# Patient Record
Sex: MALE | Race: WHITE | HISPANIC OR LATINO | Employment: FULL TIME | URBAN - METROPOLITAN AREA
[De-identification: names, ages, dates, MRNs, and addresses within clinical notes are randomized per-mention and may not be internally consistent; named-entity substitution may affect disease eponyms.]

---

## 2024-03-19 ENCOUNTER — OFFICE VISIT (OUTPATIENT)
Dept: FAMILY MEDICINE | Facility: CLINIC | Age: 39
End: 2024-03-19
Payer: COMMERCIAL

## 2024-03-19 VITALS
OXYGEN SATURATION: 99 % | WEIGHT: 180.9 LBS | DIASTOLIC BLOOD PRESSURE: 80 MMHG | RESPIRATION RATE: 16 BRPM | HEART RATE: 92 BPM | HEIGHT: 66 IN | SYSTOLIC BLOOD PRESSURE: 124 MMHG | TEMPERATURE: 98.2 F | BODY MASS INDEX: 29.07 KG/M2

## 2024-03-19 DIAGNOSIS — Z11.59 ENCOUNTER FOR HEPATITIS C SCREENING TEST FOR LOW RISK PATIENT: ICD-10-CM

## 2024-03-19 DIAGNOSIS — K42.9 UMBILICAL HERNIA WITHOUT OBSTRUCTION AND WITHOUT GANGRENE: ICD-10-CM

## 2024-03-19 DIAGNOSIS — Z11.4 ENCOUNTER FOR SCREENING FOR HIV: ICD-10-CM

## 2024-03-19 DIAGNOSIS — Z00.00 ROUTINE GENERAL MEDICAL EXAMINATION AT A HEALTH CARE FACILITY: Primary | ICD-10-CM

## 2024-03-19 LAB
ALBUMIN SERPL BCG-MCNC: 4.7 G/DL (ref 3.5–5.2)
ALP SERPL-CCNC: 69 U/L (ref 40–150)
ALT SERPL W P-5'-P-CCNC: 18 U/L (ref 0–70)
ANION GAP SERPL CALCULATED.3IONS-SCNC: 13 MMOL/L (ref 7–15)
AST SERPL W P-5'-P-CCNC: 16 U/L (ref 0–45)
BILIRUB SERPL-MCNC: 0.4 MG/DL
BUN SERPL-MCNC: 12.6 MG/DL (ref 6–20)
CALCIUM SERPL-MCNC: 9.7 MG/DL (ref 8.6–10)
CHLORIDE SERPL-SCNC: 99 MMOL/L (ref 98–107)
CHOLEST SERPL-MCNC: 227 MG/DL
CREAT SERPL-MCNC: 0.68 MG/DL (ref 0.67–1.17)
DEPRECATED HCO3 PLAS-SCNC: 23 MMOL/L (ref 22–29)
EGFRCR SERPLBLD CKD-EPI 2021: >90 ML/MIN/1.73M2
ERYTHROCYTE [DISTWIDTH] IN BLOOD BY AUTOMATED COUNT: 12.1 % (ref 10–15)
FASTING STATUS PATIENT QL REPORTED: YES
GLUCOSE SERPL-MCNC: 335 MG/DL (ref 70–99)
HBA1C MFR BLD: 13.6 % (ref 0–5.6)
HCT VFR BLD AUTO: 44.4 % (ref 40–53)
HCV AB SERPL QL IA: NONREACTIVE
HDLC SERPL-MCNC: 36 MG/DL
HGB BLD-MCNC: 15.7 G/DL (ref 13.3–17.7)
HIV 1+2 AB+HIV1 P24 AG SERPL QL IA: NONREACTIVE
LDLC SERPL CALC-MCNC: 116 MG/DL
MCH RBC QN AUTO: 28.8 PG (ref 26.5–33)
MCHC RBC AUTO-ENTMCNC: 35.4 G/DL (ref 31.5–36.5)
MCV RBC AUTO: 81 FL (ref 78–100)
NONHDLC SERPL-MCNC: 191 MG/DL
PLATELET # BLD AUTO: 280 10E3/UL (ref 150–450)
POTASSIUM SERPL-SCNC: 4.3 MMOL/L (ref 3.4–5.3)
PROT SERPL-MCNC: 8.2 G/DL (ref 6.4–8.3)
RBC # BLD AUTO: 5.46 10E6/UL (ref 4.4–5.9)
SODIUM SERPL-SCNC: 135 MMOL/L (ref 135–145)
TRIGL SERPL-MCNC: 376 MG/DL
TSH SERPL DL<=0.005 MIU/L-ACNC: 1.39 UIU/ML (ref 0.3–4.2)
VIT D+METAB SERPL-MCNC: 17 NG/ML (ref 20–50)
WBC # BLD AUTO: 6.8 10E3/UL (ref 4–11)

## 2024-03-19 PROCEDURE — 36415 COLL VENOUS BLD VENIPUNCTURE: CPT | Performed by: STUDENT IN AN ORGANIZED HEALTH CARE EDUCATION/TRAINING PROGRAM

## 2024-03-19 PROCEDURE — 82306 VITAMIN D 25 HYDROXY: CPT | Performed by: STUDENT IN AN ORGANIZED HEALTH CARE EDUCATION/TRAINING PROGRAM

## 2024-03-19 PROCEDURE — 99385 PREV VISIT NEW AGE 18-39: CPT | Performed by: STUDENT IN AN ORGANIZED HEALTH CARE EDUCATION/TRAINING PROGRAM

## 2024-03-19 PROCEDURE — 87389 HIV-1 AG W/HIV-1&-2 AB AG IA: CPT | Performed by: STUDENT IN AN ORGANIZED HEALTH CARE EDUCATION/TRAINING PROGRAM

## 2024-03-19 PROCEDURE — 83036 HEMOGLOBIN GLYCOSYLATED A1C: CPT | Performed by: STUDENT IN AN ORGANIZED HEALTH CARE EDUCATION/TRAINING PROGRAM

## 2024-03-19 PROCEDURE — 99213 OFFICE O/P EST LOW 20 MIN: CPT | Mod: 25 | Performed by: STUDENT IN AN ORGANIZED HEALTH CARE EDUCATION/TRAINING PROGRAM

## 2024-03-19 PROCEDURE — 85027 COMPLETE CBC AUTOMATED: CPT | Performed by: STUDENT IN AN ORGANIZED HEALTH CARE EDUCATION/TRAINING PROGRAM

## 2024-03-19 PROCEDURE — 80061 LIPID PANEL: CPT | Performed by: STUDENT IN AN ORGANIZED HEALTH CARE EDUCATION/TRAINING PROGRAM

## 2024-03-19 PROCEDURE — 84443 ASSAY THYROID STIM HORMONE: CPT | Performed by: STUDENT IN AN ORGANIZED HEALTH CARE EDUCATION/TRAINING PROGRAM

## 2024-03-19 PROCEDURE — 80053 COMPREHEN METABOLIC PANEL: CPT | Performed by: STUDENT IN AN ORGANIZED HEALTH CARE EDUCATION/TRAINING PROGRAM

## 2024-03-19 PROCEDURE — 86803 HEPATITIS C AB TEST: CPT | Performed by: STUDENT IN AN ORGANIZED HEALTH CARE EDUCATION/TRAINING PROGRAM

## 2024-03-19 SDOH — HEALTH STABILITY: PHYSICAL HEALTH: ON AVERAGE, HOW MANY DAYS PER WEEK DO YOU ENGAGE IN MODERATE TO STRENUOUS EXERCISE (LIKE A BRISK WALK)?: 0 DAYS

## 2024-03-19 ASSESSMENT — PAIN SCALES - GENERAL: PAINLEVEL: NO PAIN (0)

## 2024-03-19 ASSESSMENT — SOCIAL DETERMINANTS OF HEALTH (SDOH): HOW OFTEN DO YOU GET TOGETHER WITH FRIENDS OR RELATIVES?: ONCE A WEEK

## 2024-03-19 NOTE — COMMUNITY RESOURCES LIST (PATIENT PREFERRED LANGUAGE)
March 19, 2024           TU LISTA PERSONALIZADA DE SERVICIOS y PROGRAMAS           Y NAHOMI    de emergencia      Free - Client Services  [address] [place] [address] (Distancia: 4.9 millas)  Teléfono: [phone]  Sitio web: [email or web address]  Idioma: Elieser  Tarifa: [place]  Opciones de transporte: Transporte [facility] - Housing Stabilization Services  Teléfono: [phone]  Sitio web: [email or web address]-[email or web address]  Idioma: Elieser  Horas: Vic 9:00 a. m. - 5:00 p. m. Mar 9:00 a. m. - 5:00 p. m. Mié 9:00 a. m. - 5:00 p. m. Jue 9:00 a. m. - 5:00 p. m. Vie 9:00 a. m. - 5:00 p. m.  Tarifa: Seguro  Accesibilidad: Sordos o con problemas de audición, Servicios de traducción      [facility]I - YOUTH [facility]  Teléfono: [phone]  Sitio web: [email or web address] [email or web address]  Idioma: Elieser    ón de casos de vivienda      Community Services [facility] - Housing Stabilization Services  [address] [place] [address] (Distancia: 3.4 millas)  Teléfono: [phone]  Sitio web: [email or web address]  Idioma: Elieser Henderson, Clarice, Hmong, Somalí, Español  Tarifa: Seguro  Accesibilidad: Ada accesible, Alojamiento para personas ciegas, Sordos o con problemas de audición, Servicios de traducción  Opciones de transporte: Transporte gratuito      Living - Housing Stabilization Services  [address] [place] [address] (Distancia: 11.6 millas)  Teléfono: [phone]  Sitio web: [email or web address].[email or web address]  Idioma: Elieser Henderson Somalí  Tarpietro: ousmane Cornell [facility] - Housing Stabilization Services  Teléfono: [phone]  [facility] web: [email or web address]  Idioma: Elieser  Horas: Vic 8:00 a. m. - 4:00 p. m. Mar 8:00 a. m. - 4:00 p. m. Mié 8:00 a. m. - 4:00 p. m. Jue 8:00 a. m. - 4:00 p. m. Vie 8:00 a. m. - 4:00 p. m.  Deja: Jeniffer  Accesibilidad: Teja  para ciegos, sordos o con problemas de audición  Opciones de transporte: Transporte gratuito    sin carol previa para personas sin hogar      [facility] - Warming or cooling center - [facility] and [facility]  [address] [place] [address] (Distancia: 1.9 millas)  Teléfono: [phone]  Idioma: Elieser Patricka: Sharples  Accesibilidad: Ada accesible  Opciones de transporte: Transporte gratuito      [facility] - Drop-in center or day shelter  [address] [place] [address] (Distancia: 2.5 millas)  Teléfono: [phone]  Sitio web: http://Future [email or web address]  Idioma: Elieser Short: Sharples  Accesibilidad: Ada accesible      LOVE - LAUNDRY LOVE  Sitio web: http://www.laundrylove.[employer]        y Recreación    individuales/de equipo      of [employer] - Sports clubs and recreational activities  [address] [place] [address] (Distancia: 1.7 millas)  Teléfono: [phone]  Sitio web: https://[facility].gov/  Idioma: Elieser Patricka: Auto pago      [facility] [employer] [facility] - Sports clubs and recreational activities - [facility] [employer]  [address] [place] [address] (Distancia: 1.3 millas)  Teléfono: [phone]  Idioma: Kp Mon Laosiano  Tarifa: Auto pago      [facility] - Adult Enrichment  Teléfono: [phone]  Sitio web: [email or web address]-[email or web address]  Idioma: Elieser  Horas: Vic 7:30 a. m. - 4:00 p. m. Mar 7:30 a. m. - 4:00 p. m. Mié 7:30 a. m. - 4:00 p. m. Jue 7:30 a. m. - 4:00 p. m. Vie 7:30 a. m. - 4:00 p. m.    de perriicio      [facility] - VIRTUAL Y  [address] [place] [address] (Distancia: 1.5 millas)  Teléfono: [phone]  Sitio web: [email or web address]://www.[email or web address].[email or web address]-sxom-[email or web address]  Idioma: Elieser      of [place] - [employer]  [address] [place] [address] (Distancia: 1.3  deanne)  Idioma: Elieser  Accesibilidad: [facility] Services - Care Coordination (Healthcare only)  Teléfono: [phone]  Sitio web: https://[email or web address]  Idioma: Rafi Power  Horas: Mié 9:00 a. m. - 11:30 a. m. Jue 13:00 - 16:00, 17:30 - 19:00               NÚMEROS Y SITIOS WEB IMPORTANTES        Servicios de emergencia  911  .   La vía unida  211 http://211unitedway.org  .   Control de veneno  [phone] [phone] http://mnpoison.org http://wisconsinpoison.org  .     Salvavidas para el suicidio y las crisis  98http://988lifeline.org  .   Línea directa nacional de abuso infantil Childhelp  905.726.8641 http://Childhelphotline.org   .   Línea directa nacional de agresión sexual  [phone] (LAURIE) http://Rainn.org   .     Línea Nacional de Seguridad para Fugitivos  [phone] (FUGA) http://1800runaway.org  .   Apoyo easton el embarazo y el posparto  Llame o envíe un mensaje de texto al [phone] [place]: http://ppsupportmn.org [place]: http://[employer].com/wi  .   Línea de ayuda nacional para el abuso de sustancias ([facility])  [phone] (3731) http://Findtreatment.gov   .                DESCARGO DE RESPONSABILIDAD: [employer] no respalda a ningún proveedor de servicios mencionado en esta lista de recursos. [employer] no garantiza que los servicios mencionados en esta lista de recursos estén disponibles para usted o mejoren smyth shanda o bienestar.    [facility]

## 2024-03-19 NOTE — LETTER
March 20, 2024      Ralph Soto  [address] [place] [address]        Dear ,    We are writing to inform you of your test results.    Test results indicate you may require additional follow up, see comment below.    Please call clinic at [phone].     Resulted Orders   Hepatitis C Screen Reflex to HCV RNA Quant and Genotype   Result Value Ref Range    Hepatitis C Antibody Nonreactive Nonreactive      Comment:      A nonreactive screening test result does not exclude the possibility of exposure to or infection with HCV. Nonreactive screening test results in individuals with prior exposure to HCV may be due to antibody levels below the limit of detection of this assay or lack of reactivity to the HCV antigens used in this assay. Patients with recent HCV infections (<3 months from time of exposure) may have false-negative HCV antibody results due to the time needed for seroconversion (average of 8 to 9 weeks).   HIV Antigen Antibody Combo   Result Value Ref Range    HIV Antigen Antibody Combo Nonreactive Nonreactive      Comment:      Negative HIV-1 p24 antigen and HIV-1/2 antibody screening test results usually indicate the absence of HIV-1 and HIV-2 infection. However, such negative results do not rule-out acute HIV infection.  If acute HIV-1 or HIV-2 infection is suspected, detection of HIV-1 or HIV-2 RNA  is recommended.    Vitamin D Deficiency   Result Value Ref Range    Vitamin D, Total (25-Hydroxy) 17 (L) 20 - 50 ng/mL      Comment:      mild to moderate deficiency    Narrative    Season, race, dietary intake, and treatment affect the concentration of 25-hydroxy-Vitamin D. Values may decrease during winter months and increase during summer months.    Vitamin D determination is routinely performed by an immunoassay specific for 25 hydroxyvitamin D3.  If an individual is on vitamin D2(ergocalciferol) supplementation, please specify 25 OH vitamin D2 and D3 level determination by LCMSMS  test VITD23.     CBC with platelets   Result Value Ref Range    WBC Count 6.8 4.0 - 11.0 10e3/uL    RBC Count 5.46 4.40 - 5.90 10e6/uL    Hemoglobin 15.7 13.3 - 17.7 g/dL    Hematocrit 44.4 40.0 - 53.0 %    MCV 81 78 - 100 fL    MCH 28.8 26.5 - 33.0 pg    MCHC 35.4 31.5 - 36.5 g/dL    RDW 12.1 10.0 - 15.0 %    Platelet Count 280 150 - 450 10e3/uL   Comprehensive metabolic panel (BMP + Alb, Alk Phos, ALT, AST, Total. Bili, TP)   Result Value Ref Range    Sodium 135 135 - 145 mmol/L      Comment:      Reference intervals for this test were updated on 09/26/2023 to more accurately reflect our healthy population. There may be differences in the flagging of prior results with similar values performed with this method. Interpretation of those prior results can be made in the context of the updated reference intervals.     Potassium 4.3 3.4 - 5.3 mmol/L    Carbon Dioxide (CO2) 23 22 - 29 mmol/L    Anion Gap 13 7 - 15 mmol/L    Urea Nitrogen 12.6 6.0 - 20.0 mg/dL    Creatinine 0.68 0.67 - 1.17 mg/dL    GFR Estimate >90 >60 mL/min/1.73m2    Calcium 9.7 8.6 - 10.0 mg/dL    Chloride 99 98 - 107 mmol/L    Glucose 335 (H) 70 - 99 mg/dL    Alkaline Phosphatase 69 40 - 150 U/L      Comment:      Reference intervals for this test were updated on 11/14/2023 to more accurately reflect our healthy population. There may be differences in the flagging of prior results with similar values performed with this method. Interpretation of those prior results can be made in the context of the updated reference intervals.    AST 16 0 - 45 U/L      Comment:      Reference intervals for this test were updated on 6/12/2023 to more accurately reflect our healthy population. There may be differences in the flagging of prior results with similar values performed with this method. Interpretation of those prior results can be made in the context of the updated reference intervals.    ALT 18 0 - 70 U/L      Comment:      Reference intervals for this  test were updated on 6/12/2023 to more accurately reflect our healthy population. There may be differences in the flagging of prior results with similar values performed with this method. Interpretation of those prior results can be made in the context of the updated reference intervals.      Protein Total 8.2 6.4 - 8.3 g/dL    Albumin 4.7 3.5 - 5.2 g/dL    Bilirubin Total 0.4 <=1.2 mg/dL   Hemoglobin A1c   Result Value Ref Range    Hemoglobin A1C 13.6 (H) 0.0 - 5.6 %   Lipid Profile (Chol, Trig, HDL, LDL calc)   Result Value Ref Range    Cholesterol 227 (H) <200 mg/dL    Triglycerides 376 (H) <150 mg/dL    Direct Measure HDL 36 (L) >=40 mg/dL    LDL Cholesterol Calculated 116 (H) <=100 mg/dL    Non HDL Cholesterol 191 (H) <130 mg/dL    Patient Fasting > 8hrs? Yes     Narrative    Cholesterol  Desirable:  <200 mg/dL    Triglycerides  Normal:  Less than 150 mg/dL  Borderline High:  150-199 mg/dL  High:  200-499 mg/dL  Very High:  Greater than or equal to 500 mg/dL    Direct Measure HDL  Female:  Greater than or equal to 50 mg/dL   Male:  Greater than or equal to 40 mg/dL    LDL Cholesterol  Desirable:  <100mg/dL  Above Desirable:  100-129 mg/dL   Borderline High:  130-159 mg/dL   High:  160-189 mg/dL   Very High:  >= 190 mg/dL    Non HDL Cholesterol  Desirable:  130 mg/dL  Above Desirable:  130-159 mg/dL  Borderline High:  160-189 mg/dL  High:  190-219 mg/dL  Very High:  Greater than or equal to 220 mg/dL   TSH with free T4 reflex   Result Value Ref Range    TSH 1.39 0.30 - 4.20 uIU/mL       If you have any questions or concerns, please call the clinic at the number listed above.       Sincerely,      Estefania Vega, DO

## 2024-03-19 NOTE — PROGRESS NOTES
"Preventive Care Visit  [facility]  Estefania Vega DO, Family Medicine  Mar 19, 2024      SUBJECTIVE:   Ralph is a 38 year old, presenting for the following:  Physical (Patient is here for a physical today. No questions or concerns today. Just checking up on general health. )        3/19/2024     7:11 AM   Additional Questions   Roomed by Smita DIMAS MA   Accompanied by Self     Healthy Habits:     Getting at least 3 servings of Calcium per day:  NO    Bi-annual eye exam:  Yes    Dental care twice a year:  NO    Sleep apnea or symptoms of sleep apnea:  None    Diet:  Regular (no restrictions)    Frequency of exercise:  None    Duration of exercise:  N/A    Taking medications regularly:  Yes    Barriers to taking medications:  Not applicable    Medication side effects:  Not applicable    Additional concerns today:  Yes        Today's PHQ-2 Score:       3/19/2024     7:00 AM   PHQ-2 ( 1999 Pf[employer])   Q1: Little interest or pleasure in doing things 0   Q2: Feeling down, depressed or hopeless 0   PHQ-2 Score 0   Q1: Little interest or pleasure in doing things Not at all   Q2: Feeling down, depressed or hopeless Not at all   PHQ-2 Score 0       Have you ever done Advance Care Planning? (For example, a Health Directive, POLST, or a discussion with a medical provider or your loved ones about your wishes): No, advance care planning information given to patient to review.  Patient declined advance care planning discussion at this time.    Social History     Tobacco Use    Smoking status: Never    Smokeless tobacco: Never   Substance Use Topics    Alcohol use: Not on file             3/19/2024     6:59 AM   Alcohol Use   Prescreen: >3 drinks/day or >7 drinks/week? No       Last PSA: No results found for: \"PSA\"    Reviewed orders with patient. Reviewed health maintenance and updated orders accordingly - Yes  Lab work is in process  Labs reviewed in EPIC  BP Readings from Last 3 Encounters:   03/19/24 " "124/80    Wt Readings from Last 3 Encounters:   03/19/24 82.1 kg (180 lb 14.4 oz)                  There is no problem list on file for this patient.    History reviewed. No pertinent surgical history.    Social History     Tobacco Use    Smoking status: Never    Smokeless tobacco: Never   Substance Use Topics    Alcohol use: Not on file     History reviewed. No pertinent family history.      No current outpatient medications on file.     No Known Allergies    Reviewed and updated as needed this visit by clinical staff   Tobacco  Allergies  Meds              Reviewed and updated as needed this visit by Provider                  History reviewed. No pertinent past medical history.   History reviewed. No pertinent surgical history.  OB History   No obstetric history on file.     Review of Systems    Review of Systems  Constitutional, HEENT, cardiovascular, pulmonary, GI, , musculoskeletal, neuro, skin, endocrine and psych systems are negative, except as otherwise noted.    OBJECTIVE:   /80 (BP Location: Right arm, Patient Position: Sitting, Cuff Size: Adult Regular)   Pulse 92   Temp 98.2  F (36.8  C) (Oral)   Resp 16   Ht 1.67 m (5' 5.75\")   Wt 82.1 kg (180 lb 14.4 oz)   SpO2 99%   BMI 29.42 kg/m     Estimated body mass index is 29.42 kg/m  as calculated from the following:    Height as of this encounter: 1.67 m (5' 5.75\").    Weight as of this encounter: 82.1 kg (180 lb 14.4 oz).  Physical Exam  GENERAL: alert and no distress  EYES: Eyes grossly normal to inspection, PERRL and conjunctivae and sclerae normal  HENT: ear canals and TM's normal, nose and mouth without ulcers or lesions  NECK: no adenopathy, no asymmetry, masses, or scars  RESP: lungs clear to auscultation - no rales, rhonchi or wheezes  CV: regular rate and rhythm, normal S1 S2, no S3 or S4, no murmur, click or rub, no peripheral edema  ABDOMEN: soft, nontender, no hepatosplenomegaly, no masses and bowel sounds normal  MS: no gross " musculoskeletal defects noted, no edema  NEURO: Normal strength and tone, mentation intact and speech normal    ASSESSMENT/PLAN:        Diagnosis Comments   1. Routine general medical examination at a health care facility  TSH with free T4 reflex, Lipid Profile (Chol, Trig, HDL, LDL calc), Hemoglobin A1c, Comprehensive metabolic panel (BMP + Alb, Alk Phos, ALT, AST, Total. Bili, TP), CBC with platelets, Vitamin D Deficiency       2. Umbilical hernia without obstruction and without gangrene  Adult General Surg Referral       3. Encounter for hepatitis C screening test for low risk patient  Hepatitis C Screen Reflex to HCV RNA Quant and Genotype       4. Encounter for screening for HIV  HIV Antigen Antibody Combo           Patient who is coming here to establish care/preventative.    Patient moved here from [place] about 2 years ago.  Has not established a medical home.    Home life: wife and daughter   Occupation: drive a truck   Sexually active:yes, no concerns   Safety concerns:no   Diet/exercise:poor, counseling provided.    Family history of cancers:Maternal aunt got throat cancer ( smoker), Maternal uncle  ( age 40) - esophageal cancer, Maternal aunt ( ~ 40s) - uterine cancer   Eye exam/dental exam: UTD on eye exam.  Needs updated dental.  Has an upcoming appointment.  Alcohol use:no   Tobacco use/marijuana use/drug use:no  Mental health:stable   Vaccines: Patient notes he has his vaccination records at home.  Declines vaccines for today.  He will bring the vaccination records to the front and I will review them once available.  Blood work: Ordered    Umbilical hernia:  Has had it for about 14 years.  Is not growing, causing him pain or bothering him.  However, he is interested in getting elective surgery for it.  Surgical consult placed.    Unintentional weight loss:  Patient notes that when he moved from [place], he was about 220 pounds.  Over the last 1 to 2 years, he has been losing weight  "unintentionally.  He has not been exercising and has not changed his diet significantly.  Has no B type symptoms.  No fevers, night sweats, persistent fatigue.  Patient \"feels well\".  Plan:  - Will start with some baseline blood work to rule out secondary etiologies    Patient has been advised of split billing requirements and indicates understanding: Yes      Counseling  Reviewed preventive health counseling, as reflected in patient instructions      BMI  Estimated body mass index is 29.42 kg/m  as calculated from the following:    Height as of this encounter: 1.67 m (5' 5.75\").    Weight as of this encounter: 82.1 kg (180 lb 14.4 oz).   Weight management plan: Discussed healthy diet and exercise guidelines      He reports that he has never smoked. He has never used smokeless tobacco.        Signed Electronically by: Estefania Vega DO  "

## 2024-03-19 NOTE — PATIENT INSTRUCTIONS
Preventive Care Advice   This is general advice given by our system to help you stay healthy. However, your care team may have specific advice just for you. Please talk to your care team about your preventive care needs.  Nutrition  Eat 5 or more servings of fruits and vegetables each day.  Try wheat bread, brown rice and whole grain pasta (instead of white bread, rice, and pasta).  Get enough calcium and vitamin D. Check the label on foods and aim for 100% of the RDA (recommended daily allowance).  Lifestyle  Exercise at least 150 minutes each week   (30 minutes a day, 5 days a week).  Do muscle strengthening activities 2 days a week. These help control your weight and prevent disease.  No smoking.  Wear sunscreen to prevent skin cancer.  Have a dental exam and cleaning every 6 months.  Yearly exams  See your health care team every year to talk about:  Any changes in your health.  Any medicines your care team has prescribed.  Preventive care, family planning, and ways to prevent chronic diseases.  Shots (vaccines)   HPV shots (up to age 26), if you've never had them before.  Hepatitis B shots (up to age 59), if you've never had them before.  COVID-19 shot: Get this shot when it's due.  Flu shot: Get a flu shot every year.  Tetanus shot: Get a tetanus shot every 10 years.  Pneumococcal, hepatitis A, and RSV shots: Ask your care team if you need these based on your risk.  Shingles shot (for age 50 and up).  General health tests  Diabetes screening:  Starting at age 35, Get screened for diabetes at least every 3 years.  If you are younger than age 35, ask your care team if you should be screened for diabetes.  Cholesterol test: At age 39, start having a cholesterol test every 5 years, or more often if advised.  Bone density scan (DEXA): At age 50, ask your care team if you should have this scan for osteoporosis (brittle bones).  Hepatitis C: Get tested at least once in your life.  STIs (sexually transmitted  infections)  Before age 24: Ask your care team if you should be screened for STIs.  After age 24: Get screened for STIs if you're at risk. You are at risk for STIs (including HIV) if:  You are sexually active with more than one person.  You don't use condoms every time.  You or a partner was diagnosed with a sexually transmitted infection.  If you are at risk for HIV, ask about PrEP medicine to prevent HIV.  Get tested for HIV at least once in your life, whether you are at risk for HIV or not.  Cancer screening tests  Cervical cancer screening: If you have a cervix, begin getting regular cervical cancer screening tests at age 21. Most people who have regular screenings with normal results can stop after age 65. Talk about this with your provider.  Breast cancer scan (mammogram): If you've ever had breasts, begin having regular mammograms starting at age 40. This is a scan to check for breast cancer.  Colon cancer screening: It is important to start screening for colon cancer at age 45.  Have a colonoscopy test every 10 years (or more often if you're at risk) Or, ask your provider about stool tests like a FIT test every year or Cologuard test every 3 years.  To learn more about your testing options, visit: https://www.[employer]/409528.pdf.  For help making a decision, visit: https://bit.ly/tb86363.  Prostate cancer screening test: If you have a prostate and are age 55 to 69, ask your provider if you would benefit from a yearly prostate cancer screening test.  Lung cancer screening: If you are a current or former smoker age 50 to 80, ask your care team if ongoing lung cancer screenings are right for you.  For informational purposes only. Not to replace the advice of your health care provider. Copyright   2023 [employer]. All rights reserved. Clinically reviewed by the [facility] Transitions Program. [employer] 482018 - REV 01/24.

## 2024-03-19 NOTE — COMMUNITY RESOURCES LIST (ENGLISH)
March 19, 2024           YOUR PERSONALIZED LIST OF SERVICES & PROGRAMS           & SHELTER    Housing      Free - Client Services  [address] [place] [address] (Distance: 4.9 miles)  Phone: [phone]  Website: https://PingSome.[employer]/  Language: English  Fee: Free  Transportation Options: Free transportation      [facility] - Housing Stabilization Services  Phone: (932) 145-2907  Website: https://Tailored/Housing-Stabilization.html  Language: English  Hours: Mon 9:00 AM - 5:00 PM Tue 9:00 AM - 5:00 PM Wed 9:00 AM - 5:00 PM Thu 9:00 AM - 5:00 PM Fri 9:00 AM - 5:00 PM  Fee: Insurance  Accessibility: Deaf or hard of hearing, Translation services      [facility]  Phone: [phone]  Website: https://www.AirInSpace.org/  Language: English    Case Management      [employer] - Housing Stabilization Services  [address] [place] [address] (Distance: 3.4 miles)  Phone: [phone]) [phone]  Website: https://www.opportunityIn[employer]ire [employer] Systems.org/  Language: Pashto, English, Ukrainian, Hmong, Latvian, Tanzanian  Fee: Insurance  Accessibility: Ada accessible, Blind accommodation, Deaf or hard of hearing, Translation services  Transportation Options: Free transportation      Living - Housing Stabilization Services  [address] [place] [address] (Distance: 11.6 miles)  Phone: [phone]  Website: https://Un[employer] Systems.Datalot  Language: Pashto, English, Latvian  Fee: Insurance, Self pay      [employer] - Housing Stabilization Services  Phone: [phone]) [phone]  Website: https://homebasemn.com/  Language: English  Hours: Mon 8:00 AM - 4:00 PM Tue 8:00 AM - 4:00 PM Wed 8:00 AM - 4:00 PM Thu 8:00 AM - 4:00 PM Fri 8:00 AM - 4:00 PM  Fee: Free  Accessibility: Blind accommodation, Deaf or hard of hearing  Transportation Options: Free transportation    Drop-In Services      [facility] - Warming or cooling [place] -  [facility] [place]  [address] [place] [address] (Distance: 1.9 miles)  Phone: [phone]  Language: English  Fee: Free  Accessibility: Ada accessible  Transportation Options: Free transportation      [facility] - Drop-in center or day shelter  [address] [place] [address] (Distance: 2.5 miles)  Phone: [phone]) [phone]  Website: http://Qinec  Language: English  Fee: Free  Accessibility: Ada accessible      LOVE - LAUNDRY LOVE  Website: http://www.laundrylove.org        & RECREATION    Sports      [facility] - Sports clubs and recreational activities  [address] [place] [address] (Distance: 1.7 miles)  Phone: [phone]  Website: https://[place]mn.[employer]/  Language: English  Fee: Self pay      [facility] [facility] - Sports clubs and recreational activities - [facility] [employer] [facility]  [address] [place] [address] (Distance: 1.3 miles)  Phone: [phone]  Language: English, Sebastián Goodrich  Fee: Self pay      [facility] - Adult Enrichment  Phone: [phone]  Website: https://Tristar/adults-seniors/adult-enrichment/  Language: English  Hours: Mon 7:30 AM - 4:00 PM Tue 7:30 AM - 4:00 PM Wed 7:30 AM - 4:00 PM Thu 7:30 AM - 4:00 PM Fri 7:30 AM - 4:00 PM    Classes/Groups      [facility] - VIRTUAL Y  [address] [place] [address] (Distance: 1.5 miles)  Phone: [phone]  Website: https://www.CrowdFlower.org/estdgsb-ojzg-ewmkw  Language: English      of [place] - Open Gym  [address] [place] [address] (Distance: 1.3 miles)  Language: English  Accessibility: Ada accessible      [facility] Services - Care Coordination (Healthcare only)  Phone: [phone]  Website: https://[facility].org  Language: English, Lao  Hours: Wed 9:00 AM - 11:30 AM Thu 1:00 PM - 4:00 PM, 5:30 PM  - 7:00 PM               IMPORTANT NUMBERS & WEBSITES        Emergency Services  911  .   United Way  211 http://211unitedway.org  .   Poison Control  (197) 119-7703 http://[place]poison.org http://wisconsinpoison.org  .     Suicide and Crisis Lifeline  988 http://988lifeline.org  .   Childhelp [place] Child Abuse Hotline  364.554.4107 http://Childhelphotline.org   .   [place] Sexual Assault Hotline  (295) 188-6847 (HOPE) http://Twelvefoldn.org   .     [place] Runaway Safeline  (987) 713-1824 (RUNAWAY) http://ebooxter.comruPepperfry.com.Arcot Systems  .   Pregnancy & Postpartum Support  Call/text 242-847-5725  [place]: http://ppsupportmn.org  [place]: http://DigiSynd.com/wi  .   Substance Abuse National Helpline ([facility])  974-508-HELP (7281) http://Findtreatment.gov   .                DISCLAIMER: [employer] does not endorse any service providers mentioned in this resource list. [employer] does not guarantee that the services mentioned in this resource list will be available to you or will improve your health or wellness.    [facility]

## 2024-03-20 ENCOUNTER — TELEPHONE (OUTPATIENT)
Dept: FAMILY MEDICINE | Facility: CLINIC | Age: 39
End: 2024-03-20
Payer: COMMERCIAL

## 2024-03-20 NOTE — TELEPHONE ENCOUNTER
LMTCB for the pt with a phone .     If pt returns this call, please review the result message below with him from provider and assist in scheduling appt as needed.     Thank you     Result letter mailed.

## 2024-03-20 NOTE — TELEPHONE ENCOUNTER
----- Message from Estefania Vega DO sent at 3/20/2024  7:21 AM CDT -----  Could we call the patient and make sure he got this information and help him schedule. Ok to override    Your A1c ( diabetes screen) is consistent with uncontrolled diabetes. Your sugars are very high ( in the 300s).Your current A1c is 13.6. normal range A1c is below 5.7 ( non diabetic range). Based on the number, I am guessing you have had diabetes for at least a year. Uncontrolled diabetes increases risk of heart attacks/strokes and can eventually lead to kidney failure + loss of limbs. I would suggest that we start working towards getting the diabetes under control ASAP. Are you able to meet with me via VV sometime in the next 2 weeks to discuss treatment of your diabetes?     Your cholesterol is quite high as well, due to the diabetes.     Hep C and HIV are negative    Your Vit D is very low. Would start taking Vit D3 2000 international unit(s) daily over the counter.

## 2024-03-21 NOTE — TELEPHONE ENCOUNTER
I am unable to find a open slot for pt to see Gary in the next 2 wks per her message to pt. Wants to see him vis VV to discuss his diabetes

## 2024-03-22 ENCOUNTER — HOSPITAL ENCOUNTER (OUTPATIENT)
Facility: HOSPITAL | Age: 39
End: 2024-03-22
Attending: SPECIALIST | Admitting: SPECIALIST
Payer: COMMERCIAL

## 2024-03-22 ENCOUNTER — OFFICE VISIT (OUTPATIENT)
Dept: SURGERY | Facility: CLINIC | Age: 39
End: 2024-03-22
Attending: STUDENT IN AN ORGANIZED HEALTH CARE EDUCATION/TRAINING PROGRAM
Payer: COMMERCIAL

## 2024-03-22 VITALS
BODY MASS INDEX: 29.41 KG/M2 | WEIGHT: 183 LBS | SYSTOLIC BLOOD PRESSURE: 138 MMHG | HEIGHT: 66 IN | DIASTOLIC BLOOD PRESSURE: 70 MMHG

## 2024-03-22 DIAGNOSIS — K43.9 VENTRAL HERNIA WITHOUT OBSTRUCTION OR GANGRENE: Primary | ICD-10-CM

## 2024-03-22 DIAGNOSIS — K42.9 UMBILICAL HERNIA WITHOUT OBSTRUCTION AND WITHOUT GANGRENE: ICD-10-CM

## 2024-03-22 PROCEDURE — 99203 OFFICE O/P NEW LOW 30 MIN: CPT | Performed by: SPECIALIST

## 2024-03-22 RX ORDER — ACETAMINOPHEN 325 MG/1
975 TABLET ORAL ONCE
Status: CANCELLED | OUTPATIENT
Start: 2024-03-22 | End: 2024-03-22

## 2024-03-22 NOTE — LETTER
"    3/22/2024         RE: Ralph Soto  [address]  [place] [address]        Dear Colleague,    Thank you for referring your patient, Ralph Soto, to the [facility] [place]. Please see a copy of my visit note below.          HPI:  This is a 38 year old male here today with concerns of pain and bulging in his ventral area. He has noted this for the past a a few  month. The symptoms have progressed and increased over this time. He comes in for evaluation secondary to the hernia causing enough issues to bother them with daily activities or chores.    Allergies:Patient has no known allergies.    History reviewed. No pertinent past medical history.    History reviewed. No pertinent surgical history.    CURRENT MEDS:      History reviewed. No pertinent family history.     reports that he has never smoked. He has never used smokeless tobacco. He reports current alcohol use.    Review of Systems - Negative except abdominal wall bulge and pain. Otherwise twelve system of review is negative.      Vitals:    03/22/24 1413   BP: 138/70   BP Location: Right arm   Patient Position: Sitting   Cuff Size: Adult Small   Weight: 83 kg (183 lb)   Height: 1.67 m (5' 5.75\")       Body mass index is 29.76 kg/m .    EXAM:  General: NAD   HEENT: normocephalic, PERRLA and EOMS intact  Mounth: Mucus membranes moist  Neck: Supple  Chest: Clear to auscultation bilaterally  CV: RRR  ABD: Soft nontender and nondistended, ventral hernia, reducible  EXT: Warm, pulses intact,   Neuro: Alert and oriented x3  Back: no CVA tenderness        Assessment/Plan: Pt with a ventral hernia. I discussed this at length with He.  I went over conservative management as well as surgical treatment of this.. I would plan on doing this via anrobotic  approach with possible use of mesh. I went over the small risks of surgery including but not limited to bleeding and infection, anesthesia, recurrence rates and " nerve injury. I discussed the expected recovery time as well. Will get this scheduled. He will contact us to have this scheduled.      Rei Pierce MD ,MD Rei Pierce MD  General Surgery [phone]  Vascular Surgery [phone]        Again, thank you for allowing me to participate in the care of your patient.        Sincerely,        Rei Pierce MD

## 2024-03-25 ENCOUNTER — APPOINTMENT (OUTPATIENT)
Dept: INTERPRETER SERVICES | Facility: CLINIC | Age: 39
End: 2024-03-25
Payer: COMMERCIAL

## 2024-03-25 NOTE — TELEPHONE ENCOUNTER
I have some openings today as people canceled because of the weather.  We could call him and see if we can set that up ASA.    Otherwise, I can see him on the 15th.

## 2024-03-26 ENCOUNTER — APPOINTMENT (OUTPATIENT)
Dept: INTERPRETER SERVICES | Facility: CLINIC | Age: 39
End: 2024-03-26
Payer: COMMERCIAL

## 2024-03-28 NOTE — PROGRESS NOTES
"      HPI:  This is a 38 year old male here today with concerns of pain and bulging in his ventral area. He has noted this for the past a a few  month. The symptoms have progressed and increased over this time. He comes in for evaluation secondary to the hernia causing enough issues to bother them with daily activities or chores.    Allergies:Patient has no known allergies.    History reviewed. No pertinent past medical history.    History reviewed. No pertinent surgical history.    CURRENT MEDS:      History reviewed. No pertinent family history.     reports that he has never smoked. He has never used smokeless tobacco. He reports current alcohol use.    Review of Systems - Negative except abdominal wall bulge and pain. Otherwise twelve system of review is negative.      Vitals:    03/22/24 1413   BP: 138/70   BP Location: Right arm   Patient Position: Sitting   Cuff Size: Adult Small   Weight: 83 kg (183 lb)   Height: 1.67 m (5' 5.75\")       Body mass index is 29.76 kg/m .    EXAM:  General: NAD   HEENT: normocephalic, PERRLA and EOMS intact  Mounth: Mucus membranes moist  Neck: Supple  Chest: Clear to auscultation bilaterally  CV: RRR  ABD: Soft nontender and nondistended, ventral hernia, reducible  EXT: Warm, pulses intact,   Neuro: Alert and oriented x3  Back: no CVA tenderness        Assessment/Plan: Pt with a ventral hernia. I discussed this at length with He.  I went over conservative management as well as surgical treatment of this.. I would plan on doing this via anrobotic  approach with possible use of mesh. I went over the small risks of surgery including but not limited to bleeding and infection, anesthesia, recurrence rates and nerve injury. I discussed the expected recovery time as well. Will get this scheduled. He will contact us to have this scheduled.      Rei Pierce MD ,MD Rei Pierce MD  General Surgery [phone]  Vascular Surgery [phone]      "

## 2024-04-01 ENCOUNTER — OFFICE VISIT (OUTPATIENT)
Dept: FAMILY MEDICINE | Facility: CLINIC | Age: 39
End: 2024-04-01
Payer: COMMERCIAL

## 2024-04-01 VITALS
OXYGEN SATURATION: 98 % | HEIGHT: 66 IN | DIASTOLIC BLOOD PRESSURE: 66 MMHG | RESPIRATION RATE: 16 BRPM | WEIGHT: 179 LBS | BODY MASS INDEX: 28.77 KG/M2 | HEART RATE: 79 BPM | TEMPERATURE: 98.3 F | SYSTOLIC BLOOD PRESSURE: 114 MMHG

## 2024-04-01 DIAGNOSIS — E11.65 UNCONTROLLED TYPE 2 DIABETES MELLITUS WITH HYPERGLYCEMIA (H): Primary | ICD-10-CM

## 2024-04-01 DIAGNOSIS — E55.9 VITAMIN D DEFICIENCY: ICD-10-CM

## 2024-04-01 PROBLEM — E11.9 DIABETES MELLITUS, TYPE 2 (H): Status: ACTIVE | Noted: 2024-04-01

## 2024-04-01 PROCEDURE — 90677 PCV20 VACCINE IM: CPT | Performed by: STUDENT IN AN ORGANIZED HEALTH CARE EDUCATION/TRAINING PROGRAM

## 2024-04-01 PROCEDURE — 90471 IMMUNIZATION ADMIN: CPT | Performed by: STUDENT IN AN ORGANIZED HEALTH CARE EDUCATION/TRAINING PROGRAM

## 2024-04-01 PROCEDURE — 99214 OFFICE O/P EST MOD 30 MIN: CPT | Mod: 25 | Performed by: STUDENT IN AN ORGANIZED HEALTH CARE EDUCATION/TRAINING PROGRAM

## 2024-04-01 PROCEDURE — 91320 SARSCV2 VAC 30MCG TRS-SUC IM: CPT | Performed by: STUDENT IN AN ORGANIZED HEALTH CARE EDUCATION/TRAINING PROGRAM

## 2024-04-01 PROCEDURE — 90472 IMMUNIZATION ADMIN EACH ADD: CPT | Performed by: STUDENT IN AN ORGANIZED HEALTH CARE EDUCATION/TRAINING PROGRAM

## 2024-04-01 PROCEDURE — 90480 ADMN SARSCOV2 VAC 1/ONLY CMP: CPT | Performed by: STUDENT IN AN ORGANIZED HEALTH CARE EDUCATION/TRAINING PROGRAM

## 2024-04-01 PROCEDURE — 90686 IIV4 VACC NO PRSV 0.5 ML IM: CPT | Performed by: STUDENT IN AN ORGANIZED HEALTH CARE EDUCATION/TRAINING PROGRAM

## 2024-04-01 RX ORDER — METFORMIN HCL 500 MG
500 TABLET, EXTENDED RELEASE 24 HR ORAL 2 TIMES DAILY WITH MEALS
Qty: 180 TABLET | Refills: 1 | Status: SHIPPED | OUTPATIENT
Start: 2024-04-01 | End: 2024-07-23

## 2024-04-01 RX ORDER — CHOLECALCIFEROL (VITAMIN D3) 50 MCG
1 TABLET ORAL DAILY
Qty: 90 TABLET | Refills: 1 | Status: SHIPPED | OUTPATIENT
Start: 2024-04-01

## 2024-04-01 RX ORDER — ATORVASTATIN CALCIUM 40 MG/1
40 TABLET, FILM COATED ORAL DAILY
Qty: 90 TABLET | Refills: 1 | Status: SHIPPED | OUTPATIENT
Start: 2024-04-01

## 2024-04-01 RX ORDER — LANCETS
EACH MISCELLANEOUS
Qty: 100 EACH | Refills: 6 | Status: SHIPPED | OUTPATIENT
Start: 2024-04-01 | End: 2024-06-06

## 2024-04-01 NOTE — PROGRESS NOTES
Assessment & Plan        Diagnosis Comments   1. Uncontrolled type 2 diabetes mellitus with hyperglycemia (H)  blood glucose monitoring (NO BRAND SPECIFIED) meter device kit, blood glucose (NO BRAND SPECIFIED) test strip, thin (NO BRAND SPECIFIED) lancets, atorvastatin (LIPITOR) 40 MG tablet, metFORMIN (GLUCOPHAGE XR) 500 MG 24 hr tablet, empagliflozin (JARDIANCE) 10 MG TABS tablet       2. Vitamin D deficiency  vitamin D3 (CHOLECALCIFEROL) 50 mcg (2000 units) tablet             Uncontrolled type 2 diabetes:  Pathophysiology of type 2 diabetes reviewed with the patient  Reviewed importance of diet and exercise to optimize sugars.  Patient is quite receptive to the information and has already made significant dietary changes.  Congratulated the patient on his changes.  Advised to increase exercise level  Based on A1c, would recommend that patient's start metformin and either GLP-1 or insulin.  Patient is quite hesitant about starting any injectables and would like to try oral medications only.  Ultimately, we decided that we would do metformin and Jardiance.  Reviewed side effect profile of metformin and Jardiance.  He verbalized understanding.  Glucometer dispensed-advised patient to start taking his morning fasting sugars.  Had nursing going there and do some teaching with the glucometer.  Advised nightly foot checks, eye exam  Started patient on moderate intensity statin  I will see him back in 2 months    Due for immunizations.  Patient is okay with COVID, flu and PCV 20.  Notes that he got his tetanus done in 2020 and he will bring the records in for me.  Will do hep B vaccine next time.      33 minutes spent by me on the date of the encounter doing chart review, history and exam, documentation and further activities per the note    Astrid Rosas is a 38 year old, presenting for the following health issues:  Pre-Op Exam      4/1/2024    10:02 AM   Additional Questions   Roomed by Rogerio VALDEZ  "    Patient does not need  services.  He declines.    Patient was initially here for his preop.  However, patient noted that he is planning to reschedule his hernia repair to later in the year once his \"diabetes is under control\" and he would like to focus on his diabetes today.    In early March and at that time, his A1c was found to be 13.6.  Had hyperlipidemia.  Blood glucose was 300s.  Was advised to make dietary changes and meet up with me to discuss medication management.    Today, patient notes that he has cut out his soda intake completely.  He is cutting down on tortillas and white rice intake.  Has only been eating whole-wheat Posta.  Has cut down on his snacking.  With changes in his diet, he has noticed decreased polyphasia, polydipsia and polyuria.  His energy levels are also high.    Does not currently have a glucometer and is not checking his sugars    Is trying to become more active    Has a very strong family history of type 2 diabetes.  Several family members have type 2 diabetes        Review of Systems  Constitutional, HEENT, cardiovascular, pulmonary, gi and gu systems are negative, except as otherwise noted.      Objective    /66 (BP Location: Right arm, Patient Position: Sitting, Cuff Size: Adult Regular)   Pulse 79   Temp 98.3  F (36.8  C) (Oral)   Resp 16   Ht 1.67 m (5' 5.75\")   Wt 81.2 kg (179 lb)   SpO2 98%   BMI 29.11 kg/m    Body mass index is 29.11 kg/m .  Physical Exam   GENERAL: alert and no distress  PSYCH: mentation appears normal, affect        Signed Electronically by: Estefania Vega DO    "

## 2024-04-15 NOTE — OR NURSING
Pre-op call RN Assessment    RN called to patient to review pre-op instructions for 4/18. Patient states he was told by his pre-op evaluation provider that he needs to get his blood glucose levels in better control prior to surgery as his A1C is 13.6. RN notified OR control desk and told patient to contact Dr. Hernández's office. Patient would like to reschedule once blood glucose levels are adequately controlled.

## 2024-04-17 ENCOUNTER — TELEPHONE (OUTPATIENT)
Dept: SURGERY | Facility: CLINIC | Age: 39
End: 2024-04-17
Payer: COMMERCIAL

## 2024-04-17 NOTE — TELEPHONE ENCOUNTER
Received message from OR schedulers that patient is possibly wanting to cancel and reschedule surgery for tomorrow 4/18 with Dr Pierce due to elevated A1c.  Based on clinic notes from PCP appt yesterday 4/16, it appears that information is accurate, but I am unable to reach patient at this time to confirm.  Attempted to call patient at primary number listed.  No answer, however I did leave VM asking patient to call me back to discuss.  Waiting for call back at this time.

## 2024-05-06 ENCOUNTER — APPOINTMENT (OUTPATIENT)
Dept: INTERPRETER SERVICES | Facility: CLINIC | Age: 39
End: 2024-05-06
Payer: COMMERCIAL

## 2024-05-13 ENCOUNTER — MYC REFILL (OUTPATIENT)
Dept: FAMILY MEDICINE | Facility: CLINIC | Age: 39
End: 2024-05-13
Payer: COMMERCIAL

## 2024-05-13 DIAGNOSIS — E11.65 UNCONTROLLED TYPE 2 DIABETES MELLITUS WITH HYPERGLYCEMIA (H): ICD-10-CM

## 2024-05-14 RX ORDER — LANCETS
EACH MISCELLANEOUS
Qty: 100 EACH | Refills: 6 | OUTPATIENT
Start: 2024-05-14

## 2024-05-24 ENCOUNTER — TRANSFERRED RECORDS (OUTPATIENT)
Dept: MULTI SPECIALTY CLINIC | Facility: CLINIC | Age: 39
End: 2024-05-24
Payer: COMMERCIAL

## 2024-05-24 LAB — RETINOPATHY: NORMAL

## 2024-06-06 ENCOUNTER — OFFICE VISIT (OUTPATIENT)
Dept: FAMILY MEDICINE | Facility: CLINIC | Age: 39
End: 2024-06-06
Payer: COMMERCIAL

## 2024-06-06 VITALS
BODY MASS INDEX: 29.51 KG/M2 | OXYGEN SATURATION: 98 % | TEMPERATURE: 98.8 F | WEIGHT: 188 LBS | HEART RATE: 84 BPM | SYSTOLIC BLOOD PRESSURE: 122 MMHG | HEIGHT: 67 IN | DIASTOLIC BLOOD PRESSURE: 80 MMHG | RESPIRATION RATE: 16 BRPM

## 2024-06-06 DIAGNOSIS — K43.9 VENTRAL HERNIA WITHOUT OBSTRUCTION OR GANGRENE: ICD-10-CM

## 2024-06-06 DIAGNOSIS — Z23 ENCOUNTER FOR IMMUNIZATION: ICD-10-CM

## 2024-06-06 DIAGNOSIS — B35.3 TINEA PEDIS OF BOTH FEET: ICD-10-CM

## 2024-06-06 DIAGNOSIS — E11.65 UNCONTROLLED TYPE 2 DIABETES MELLITUS WITH HYPERGLYCEMIA (H): Primary | ICD-10-CM

## 2024-06-06 LAB — HBA1C MFR BLD: 7.6 % (ref 0–5.6)

## 2024-06-06 PROCEDURE — 82043 UR ALBUMIN QUANTITATIVE: CPT | Performed by: STUDENT IN AN ORGANIZED HEALTH CARE EDUCATION/TRAINING PROGRAM

## 2024-06-06 PROCEDURE — 90746 HEPB VACCINE 3 DOSE ADULT IM: CPT | Performed by: STUDENT IN AN ORGANIZED HEALTH CARE EDUCATION/TRAINING PROGRAM

## 2024-06-06 PROCEDURE — 36415 COLL VENOUS BLD VENIPUNCTURE: CPT | Performed by: STUDENT IN AN ORGANIZED HEALTH CARE EDUCATION/TRAINING PROGRAM

## 2024-06-06 PROCEDURE — 80053 COMPREHEN METABOLIC PANEL: CPT | Performed by: STUDENT IN AN ORGANIZED HEALTH CARE EDUCATION/TRAINING PROGRAM

## 2024-06-06 PROCEDURE — 83036 HEMOGLOBIN GLYCOSYLATED A1C: CPT | Performed by: STUDENT IN AN ORGANIZED HEALTH CARE EDUCATION/TRAINING PROGRAM

## 2024-06-06 PROCEDURE — 90471 IMMUNIZATION ADMIN: CPT | Performed by: STUDENT IN AN ORGANIZED HEALTH CARE EDUCATION/TRAINING PROGRAM

## 2024-06-06 PROCEDURE — 99207 PR FOOT EXAM NO CHARGE: CPT | Mod: 25 | Performed by: STUDENT IN AN ORGANIZED HEALTH CARE EDUCATION/TRAINING PROGRAM

## 2024-06-06 PROCEDURE — 90715 TDAP VACCINE 7 YRS/> IM: CPT | Performed by: STUDENT IN AN ORGANIZED HEALTH CARE EDUCATION/TRAINING PROGRAM

## 2024-06-06 PROCEDURE — 99214 OFFICE O/P EST MOD 30 MIN: CPT | Mod: 25 | Performed by: STUDENT IN AN ORGANIZED HEALTH CARE EDUCATION/TRAINING PROGRAM

## 2024-06-06 PROCEDURE — 90472 IMMUNIZATION ADMIN EACH ADD: CPT | Performed by: STUDENT IN AN ORGANIZED HEALTH CARE EDUCATION/TRAINING PROGRAM

## 2024-06-06 PROCEDURE — 82570 ASSAY OF URINE CREATININE: CPT | Performed by: STUDENT IN AN ORGANIZED HEALTH CARE EDUCATION/TRAINING PROGRAM

## 2024-06-06 RX ORDER — KETOCONAZOLE 20 MG/G
CREAM TOPICAL DAILY
Qty: 60 G | Refills: 0 | Status: SHIPPED | OUTPATIENT
Start: 2024-06-06 | End: 2024-07-06

## 2024-06-06 RX ORDER — LANCETS
EACH MISCELLANEOUS
Qty: 100 EACH | Refills: 6 | Status: SHIPPED | OUTPATIENT
Start: 2024-06-06

## 2024-06-06 ASSESSMENT — PAIN SCALES - GENERAL: PAINLEVEL: NO PAIN (0)

## 2024-06-06 NOTE — PROGRESS NOTES
Assessment & Plan        Diagnosis Comments   1. Uncontrolled type 2 diabetes mellitus with hyperglycemia (H)  thin (NO BRAND SPECIFIED) lancets, blood glucose (NO BRAND SPECIFIED) test strip, Albumin Random Urine Quantitative with Creat Ratio, Hemoglobin A1c, Comprehensive metabolic panel (BMP + Alb, Alk Phos, ALT, AST, Total. Bili, TP), FOOT EXAM       2. Encounter for immunization        3. Tinea pedis of both feet  ketoconazole (NIZORAL) 2 % external cream       4. Ventral hernia without obstruction or gangrene            Uncontrolled type 2 diabetes:  Tinea pedis, bilateral  Last A1c elevated at 13.6  Patient was started on metformin and Jardiance  He is currently taking metformin 500 mg twice daily and Jardiance 10 mg.  Tolerating medication without any issues  Reports decreased polyuria, polydipsia.  Less blurry vision  Had a diabetic eye exam done last month and per patient, no retinopathy.  Will have nursing get an CARMELINA for this  He reports no polyneuropathy symptoms  Has made significant changes to his diet-cut out rice, minimally eating tortillas, no longer drinks soda, switch to whole-wheat bread, trying to eat mostly chicken and vegetables.  No snacking between meals and no late night eating.  Is not exercising  Fasting morning sugars are oscillating between 130s to 160s based on the log that he brought in today  Plan:  - Will recheck A1c and titrate medications accordingly  -Continue statin  - We will follow-up in 3 months  - CARMELINA for eye exam  - Foot exam done today consistent with tinea pedis-will dispense ketoconazole  -Due for tetanus and hep B, ordered    Ventral hernia:  Initially came to see me after surgery declined to repair his hernia due to uncontrolled sugars  He is wondering if he is okay to get the surgery done now.  Told him that it should be okay based on the glucose log that he brought in but I we will make a final decisions once I see the blood work    The longitudinal plan of care  "for the diagnosis(es)/condition(s) as documented were addressed during this visit. Due to the added complexity in care, I will continue to support Ralph in the subsequent management and with ongoing continuity of care.    33 minutes spent by me on the date of the encounter doing chart review, history and exam, documentation and further activities per the note    Subjective   Ralph is a 38 year old, presenting for the following health issues:  Diabetes      6/6/2024    10:51 AM   Additional Questions   Roomed by jeana   Accompanied by significant other           Via the Health Maintenance questionnaire, the patient has reported the following services have been completed -Eye Exam: [facility] 2024-05-24, this information has been sent to the abstraction team.      Review of Systems  As per HPI       Objective    /80 (BP Location: Right arm, Patient Position: Sitting)   Pulse 84   Temp 98.8  F (37.1  C) (Oral)   Resp 16   Ht 1.702 m (5' 7\")   Wt 85.3 kg (188 lb)   SpO2 98%   BMI 29.44 kg/m    Body mass index is 29.44 kg/m .  Physical Exam   GENERAL: alert and no distress  RESP: lungs clear to auscultation - no rales, rhonchi or wheezes  CV: regular rate and rhythm,  no murmur, click or rub, no peripheral edema  MS: no gross musculoskeletal defects noted, no edema  PSYCH: mentation appears normal, affect normal/bright  Diabetic foot exam: normal DP and PT pulses, no trophic changes or ulcerative lesions, and normal sensory exam  Tinea pedis of the soles bilaterally    Signed Electronically by: Estefania Vega DO    "

## 2024-06-07 ENCOUNTER — TELEPHONE (OUTPATIENT)
Dept: FAMILY MEDICINE | Facility: CLINIC | Age: 39
End: 2024-06-07
Payer: COMMERCIAL

## 2024-06-07 LAB
ALBUMIN SERPL BCG-MCNC: 4.3 G/DL (ref 3.5–5.2)
ALP SERPL-CCNC: 55 U/L (ref 40–150)
ALT SERPL W P-5'-P-CCNC: 22 U/L (ref 0–70)
ANION GAP SERPL CALCULATED.3IONS-SCNC: 11 MMOL/L (ref 7–15)
AST SERPL W P-5'-P-CCNC: 18 U/L (ref 0–45)
BILIRUB SERPL-MCNC: 0.5 MG/DL
BUN SERPL-MCNC: 12.3 MG/DL (ref 6–20)
CALCIUM SERPL-MCNC: 9 MG/DL (ref 8.6–10)
CHLORIDE SERPL-SCNC: 105 MMOL/L (ref 98–107)
CREAT SERPL-MCNC: 0.73 MG/DL (ref 0.67–1.17)
CREAT UR-MCNC: 22.2 MG/DL
DEPRECATED HCO3 PLAS-SCNC: 23 MMOL/L (ref 22–29)
EGFRCR SERPLBLD CKD-EPI 2021: >90 ML/MIN/1.73M2
GLUCOSE SERPL-MCNC: 102 MG/DL (ref 70–99)
MICROALBUMIN UR-MCNC: <12 MG/L
MICROALBUMIN/CREAT UR: NORMAL MG/G{CREAT}
POTASSIUM SERPL-SCNC: 3.8 MMOL/L (ref 3.4–5.3)
PROT SERPL-MCNC: 7.7 G/DL (ref 6.4–8.3)
SODIUM SERPL-SCNC: 139 MMOL/L (ref 135–145)

## 2024-06-07 NOTE — LETTER
"Lety 10, 2024      Ralph Soto  [address] [place] [address]        Dear ,    We are writing to inform you of your test results.    \"Congrats! Your A1c is much closer to goal at 7.6. I would like you to increase your metformin to 2 pills twice daily ( 1000 mg twice daily) and keep the Jardiance the same.with this A1c, you should be ok to proceed with surgery. Please call your surgeon's office and get that scheduled\"    Recent Results (from the past 240 hour(s))   Hemoglobin A1c    Collection Time: 06/06/24 11:41 AM   Result Value Ref Range    Hemoglobin A1C 7.6 (H) 0.0 - 5.6 %   Comprehensive metabolic panel (BMP + Alb, Alk Phos, ALT, AST, Total. Bili, TP)    Collection Time: 06/06/24 11:41 AM   Result Value Ref Range    Sodium 139 135 - 145 mmol/L    Potassium 3.8 3.4 - 5.3 mmol/L    Carbon Dioxide (CO2) 23 22 - 29 mmol/L    Anion Gap 11 7 - 15 mmol/L    Urea Nitrogen 12.3 6.0 - 20.0 mg/dL    Creatinine 0.73 0.67 - 1.17 mg/dL    GFR Estimate >90 >60 mL/min/1.73m2    Calcium 9.0 8.6 - 10.0 mg/dL    Chloride 105 98 - 107 mmol/L    Glucose 102 (H) 70 - 99 mg/dL    Alkaline Phosphatase 55 40 - 150 U/L    AST 18 0 - 45 U/L    ALT 22 0 - 70 U/L    Protein Total 7.7 6.4 - 8.3 g/dL    Albumin 4.3 3.5 - 5.2 g/dL    Bilirubin Total 0.5 <=1.2 mg/dL   Albumin Random Urine Quantitative with Creat Ratio    Collection Time: 06/06/24 11:47 AM   Result Value Ref Range    Creatinine Urine mg/dL 22.2 mg/dL    Albumin Urine mg/L <12.0 mg/L    Albumin Urine mg/g Cr          If you have any questions or concerns, please call the clinic at the number listed above.       Sincerely,  Dr. Vega                "

## 2024-06-27 ENCOUNTER — TELEPHONE (OUTPATIENT)
Dept: SURGERY | Facility: CLINIC | Age: 39
End: 2024-06-27
Payer: COMMERCIAL

## 2024-06-27 NOTE — TELEPHONE ENCOUNTER
Patient called and left me a VM, wanting to schedule surgery. I returned his call this morning with the help of a  but needed to leave a VM. Will wait for his return call.

## 2024-06-27 NOTE — LETTER
Thank you for choosing [facility] for your care. You are scheduled for surgery with Dr. Pierce. Details are as follows:    Pre-op Physical: 11/22/2024 at 2:00 p.m., with Dr. eVga at [address] [place] [address]. Arrive at 1:40 p.m. Per anesthesia, failure to complete the required appointment will result in cancellation of surgery.     Surgery Date: 12/12/2024     Location: [place] [address]. Enter through the main doors of the hospital and stop at the Welcome Desk. Someone will direct/escort you to surgery admissions.     Approximate Arrival Time:  10:00 a.m. (Unless instructed differently by the pre-op call nurse)     Post op Appointment: 12/27/2024 at 12:30 p.m., with Dr. Pierce at our [place] office. [address], [place] [address]. Arrive at 12:15 p.m.     Pre-Surgical Tasks:     Review all medications with your primary care or prescribing physician; they will advise you which meds to stop and when, and when you can resume taking.  Certain medications like blood thinners and weight loss medications need to be stopped in advance of surgery to proceed safely.      Blood thinners including but not exclusive to drugs like Xarelto, Eliquis, Warfarin and Aspirin, should be stopped five days before surgery, if your prescribing provider agrees. Follow your provider's advice on stopping blood thinners because they know you best.  If you are unsure if your medication is a blood thinner, ask your prescribing provider.    Weight loss medications: There are multiple medications being used for weight management and diabetes today, and the list is growing.  Phentermine, Ozempic, Wegovy, Trulicity, and other similar medications need to be stopped one week before surgery to avoid being cancelled.  Victoza and Saxenda can be continued longer but must be  stopped one full day before surgery.  Please ask your prescribing provider for advice.    Diabetic medications: in addition to the medications talked about above that are used for either weight loss or diabetes, some people are on insulin that may require adjustment.  Please discuss managing diabetic medications with your prescribing doctor as these medications may require modification prior to surgery.     Please shower the evening before and morning of surgery with Hibiclens soap. Any [facility] can provide this to you at no cost, or it can be found at your local pharmacy.     Fasting instructions will be provided by the pre-op nurse who will call you 1-3 days before surgery.  Typically, we advise normal food up to 8 hours before you arrive for surgery. Clear liquids only from then until 2 hours before you arrive surgery, then nothing at all by mouth.  The nurse will review your specific instructions with you at the call.      Smoking impacts your body's ability to heal properly so we advise patients to quit if possible before surgery.  Plastic Surgery patients are required to be nicotine free for at least 8 weeks before surgery.      You will need an adult to drive you home and stay with you 24 hours after surgery. Public transportation or Medical Van Services are not permitted.    Visitor restrictions are subject to change, please verify with the pre-op nurse when they call how many people are permitted to accompany you.    We always encourage you to notify your insurance any time you have medical tests or procedures scheduled including surgery. The number is usually right on the back of your insurance card. To obtain pricing for surgery, please call [facility] Cost of Care at [phone] or email [email or web address].      Call our office if you have any questions. I can be reached directly at [phone]. Thank you!

## 2024-07-02 DIAGNOSIS — K42.9 UMBILICAL HERNIA WITHOUT OBSTRUCTION AND WITHOUT GANGRENE: Primary | ICD-10-CM

## 2024-07-03 ENCOUNTER — PREP FOR PROCEDURE (OUTPATIENT)
Dept: SURGERY | Facility: CLINIC | Age: 39
End: 2024-07-03
Payer: COMMERCIAL

## 2024-07-03 ENCOUNTER — APPOINTMENT (OUTPATIENT)
Dept: INTERPRETER SERVICES | Facility: CLINIC | Age: 39
End: 2024-07-03
Payer: COMMERCIAL

## 2024-07-03 DIAGNOSIS — K43.9 VENTRAL HERNIA WITHOUT OBSTRUCTION OR GANGRENE: Primary | ICD-10-CM

## 2024-07-03 NOTE — TELEPHONE ENCOUNTER
Ralph called me this morning, with the help of a , to schedule his surgery w/ Dr. Pierce. He's now scheduled on 12.12.24 at [facility]. We went over details and I let him know I will send a confirmation letter to his home. He's in agreement with the plan.

## 2024-07-23 ENCOUNTER — MYC REFILL (OUTPATIENT)
Dept: FAMILY MEDICINE | Facility: CLINIC | Age: 39
End: 2024-07-23
Payer: COMMERCIAL

## 2024-07-23 DIAGNOSIS — E11.65 UNCONTROLLED TYPE 2 DIABETES MELLITUS WITH HYPERGLYCEMIA (H): ICD-10-CM

## 2024-07-24 RX ORDER — METFORMIN HCL 500 MG
500 TABLET, EXTENDED RELEASE 24 HR ORAL 2 TIMES DAILY WITH MEALS
Qty: 180 TABLET | Refills: 1 | Status: SHIPPED | OUTPATIENT
Start: 2024-07-24

## 2024-10-06 ENCOUNTER — HEALTH MAINTENANCE LETTER (OUTPATIENT)
Age: 39
End: 2024-10-06

## 2024-11-22 ENCOUNTER — OFFICE VISIT (OUTPATIENT)
Dept: FAMILY MEDICINE | Facility: CLINIC | Age: 39
End: 2024-11-22
Payer: COMMERCIAL

## 2024-11-22 VITALS
RESPIRATION RATE: 20 BRPM | BODY MASS INDEX: 32.92 KG/M2 | HEIGHT: 65 IN | OXYGEN SATURATION: 99 % | WEIGHT: 197.6 LBS | TEMPERATURE: 97.4 F | HEART RATE: 82 BPM | DIASTOLIC BLOOD PRESSURE: 90 MMHG | SYSTOLIC BLOOD PRESSURE: 135 MMHG

## 2024-11-22 DIAGNOSIS — Z01.818 PREOP GENERAL PHYSICAL EXAM: Primary | ICD-10-CM

## 2024-11-22 DIAGNOSIS — K43.9 VENTRAL HERNIA WITHOUT OBSTRUCTION OR GANGRENE: ICD-10-CM

## 2024-11-22 DIAGNOSIS — E11.9 TYPE 2 DIABETES MELLITUS WITHOUT COMPLICATION, WITHOUT LONG-TERM CURRENT USE OF INSULIN (H): ICD-10-CM

## 2024-11-22 DIAGNOSIS — E55.9 VITAMIN D DEFICIENCY: ICD-10-CM

## 2024-11-22 LAB
ANION GAP SERPL CALCULATED.3IONS-SCNC: 12 MMOL/L (ref 7–15)
BUN SERPL-MCNC: 14.6 MG/DL (ref 6–20)
CALCIUM SERPL-MCNC: 9.8 MG/DL (ref 8.8–10.4)
CHLORIDE SERPL-SCNC: 101 MMOL/L (ref 98–107)
CREAT SERPL-MCNC: 0.79 MG/DL (ref 0.67–1.17)
EGFRCR SERPLBLD CKD-EPI 2021: >90 ML/MIN/1.73M2
ERYTHROCYTE [DISTWIDTH] IN BLOOD BY AUTOMATED COUNT: 12.4 % (ref 10–15)
EST. AVERAGE GLUCOSE BLD GHB EST-MCNC: 212 MG/DL
GLUCOSE SERPL-MCNC: 106 MG/DL (ref 70–99)
HBA1C MFR BLD: 9 % (ref 0–5.6)
HCO3 SERPL-SCNC: 24 MMOL/L (ref 22–29)
HCT VFR BLD AUTO: 44.3 % (ref 40–53)
HGB BLD-MCNC: 15.2 G/DL (ref 13.3–17.7)
MCH RBC QN AUTO: 28.5 PG (ref 26.5–33)
MCHC RBC AUTO-ENTMCNC: 34.3 G/DL (ref 31.5–36.5)
MCV RBC AUTO: 83 FL (ref 78–100)
PLATELET # BLD AUTO: 309 10E3/UL (ref 150–450)
POTASSIUM SERPL-SCNC: 3.7 MMOL/L (ref 3.4–5.3)
RBC # BLD AUTO: 5.33 10E6/UL (ref 4.4–5.9)
SODIUM SERPL-SCNC: 137 MMOL/L (ref 135–145)
WBC # BLD AUTO: 10.2 10E3/UL (ref 4–11)

## 2024-11-22 PROCEDURE — 80048 BASIC METABOLIC PNL TOTAL CA: CPT | Performed by: STUDENT IN AN ORGANIZED HEALTH CARE EDUCATION/TRAINING PROGRAM

## 2024-11-22 PROCEDURE — 85027 COMPLETE CBC AUTOMATED: CPT | Performed by: STUDENT IN AN ORGANIZED HEALTH CARE EDUCATION/TRAINING PROGRAM

## 2024-11-22 PROCEDURE — 36415 COLL VENOUS BLD VENIPUNCTURE: CPT | Performed by: STUDENT IN AN ORGANIZED HEALTH CARE EDUCATION/TRAINING PROGRAM

## 2024-11-22 PROCEDURE — 99214 OFFICE O/P EST MOD 30 MIN: CPT | Performed by: STUDENT IN AN ORGANIZED HEALTH CARE EDUCATION/TRAINING PROGRAM

## 2024-11-22 PROCEDURE — 83036 HEMOGLOBIN GLYCOSYLATED A1C: CPT | Performed by: STUDENT IN AN ORGANIZED HEALTH CARE EDUCATION/TRAINING PROGRAM

## 2024-11-22 RX ORDER — METFORMIN HYDROCHLORIDE 500 MG/1
500 TABLET, EXTENDED RELEASE ORAL 2 TIMES DAILY WITH MEALS
Qty: 180 TABLET | Refills: 1 | Status: SHIPPED | OUTPATIENT
Start: 2024-11-22

## 2024-11-22 RX ORDER — ATORVASTATIN CALCIUM 40 MG/1
40 TABLET, FILM COATED ORAL DAILY
Qty: 90 TABLET | Refills: 1 | Status: SHIPPED | OUTPATIENT
Start: 2024-11-22

## 2024-11-22 RX ORDER — CHOLECALCIFEROL (VITAMIN D3) 50 MCG
1 TABLET ORAL DAILY
Qty: 90 TABLET | Refills: 1 | Status: SHIPPED | OUTPATIENT
Start: 2024-11-22

## 2024-11-22 ASSESSMENT — PAIN SCALES - GENERAL: PAINLEVEL_OUTOF10: NO PAIN (0)

## 2024-11-22 NOTE — LETTER
November 25, 2024      Ralph Soto  [address] [place] [address]        Dear ,    We are writing to inform you of your test results.    Blood work looks good/stable for surgery     Resulted Orders   Hemoglobin A1c   Result Value Ref Range    Estimated Average Glucose 212 (H) <117 mg/dL    Hemoglobin A1C 9.0 (H) 0.0 - 5.6 %      Comment:      Normal <5.7%   Prediabetes 5.7-6.4%    Diabetes 6.5% or higher     Note: Adopted from ADA consensus guidelines.    Narrative    Results confirmed by repeat test.    CBC with platelets   Result Value Ref Range    WBC Count 10.2 4.0 - 11.0 10e3/uL    RBC Count 5.33 4.40 - 5.90 10e6/uL    Hemoglobin 15.2 13.3 - 17.7 g/dL    Hematocrit 44.3 40.0 - 53.0 %    MCV 83 78 - 100 fL    MCH 28.5 26.5 - 33.0 pg    MCHC 34.3 31.5 - 36.5 g/dL    RDW 12.4 10.0 - 15.0 %    Platelet Count 309 150 - 450 10e3/uL   Basic metabolic panel  (Ca, Cl, CO2, Creat, Gluc, K, Na, BUN)   Result Value Ref Range    Sodium 137 135 - 145 mmol/L    Potassium 3.7 3.4 - 5.3 mmol/L    Chloride 101 98 - 107 mmol/L    Carbon Dioxide (CO2) 24 22 - 29 mmol/L    Anion Gap 12 7 - 15 mmol/L    Urea Nitrogen 14.6 6.0 - 20.0 mg/dL    Creatinine 0.79 0.67 - 1.17 mg/dL    GFR Estimate >90 >60 mL/min/1.73m2      Comment:      eGFR calculated using 2021 CKD-EPI equation.    Calcium 9.8 8.8 - 10.4 mg/dL      Comment:      Reference intervals for this test were updated on 7/16/2024 to reflect our healthy population more accurately. There may be differences in the flagging of prior results with similar values performed with this method. Those prior results can be interpreted in the context of the updated reference intervals.    Glucose 106 (H) 70 - 99 mg/dL       If you have any questions or concerns, please call the clinic at the number listed above.       Sincerely,      Estefania Vega DO

## 2024-11-22 NOTE — PATIENT INSTRUCTIONS
Medication management for day of surgery:  - Please hold Jardiance 3 days ahead of time  - Please hold metformin the day of surgery  - Continue atorvastatin on day of surgery  - Please hold vitamin D and all supplements 7 days ahead of time

## 2024-11-22 NOTE — PROGRESS NOTES
Preoperative Evaluation  [address] [address]  Phone: [phone]  Fax: [phone]  Primary Provider: Estefania Vega DO  Pre-op Performing Provider: Estefania Vega DO  Nov 22, 2024 11/22/2024   Surgical Information   What procedure is being done? herniorrhphy,ventral    Facility or Hospital where procedure/surgery will be performed: [facility]    Who is doing the procedure / surgery? shan bangura    Date of surgery / procedure: 12/12/24    Time of surgery / procedure: 11:00   Where do you plan to recover after surgery? at home with family        Patient-reported     Fax number for surgical facility: Note does not need to be faxed, will be available electronically in Epic.    Assessment & Plan     The proposed surgical procedure is considered INTERMEDIATE risk.       Diagnosis Comments   1. Preop general physical exam  CBC with platelets, Basic metabolic panel  (Ca, Cl, CO2, Creat, Gluc, K, Na, BUN)       2. Ventral hernia without obstruction or gangrene        3. Type 2 diabetes mellitus without complication, without long-term current use of insulin (H)  Hemoglobin A1c, empagliflozin (JARDIANCE) 10 MG TABS tablet, metFORMIN (GLUCOPHAGE XR) 500 MG 24 hr tablet, atorvastatin (LIPITOR) 40 MG tablet       4. Vitamin D deficiency  vitamin D3 (CHOLECALCIFEROL) 50 mcg (2000 units) tablet           Preop:  Hernia repair  RCRI1   No EKG indicated today  Will get CBC and BMP for anesthesia purposes  Will recheck A1c and let surgical team know so they can decide if they would like to move ahead with the surgery.   Medication management:  - Please hold Jardiance 3 days ahead of time  - Please hold metformin the day of surgery  - Continue atorvastatin on day of surgery  - Please hold vitamin D and all supplements 7 days ahead of time    Patient is tentatively cleared for surgery as long as the boodwork looks good       Patient  requesting med refills - provided      - No identified additional risk factors other than previously addressed      Recommendation  Approval given to proceed with proposed procedure pending review of diagnostic evaluation.    Astrid Rosas is a 39 year old, presenting for the following:  Pre-Op Exam (HERNIORRHAPHY, VENTRAL, ROBOT-ASSISTED, LAPAROSCOPIC, USING DA HERI XI )    Patient is here for preop for ventral hernia repair.  He was declined a ventral hernia repair earlier this year because his A1c 13.6.  Since then, he has been working on his diet and exercise.  A1c was improved at 7.6 about 5 months ago.    Today, patient reports that his abdominal hernia feels uncomfortable at times that does not cause any changes in bowel habits.  He is always able to retract it.  He has no bloody bowel movements.  No nausea or vomiting    Unfortunately, he stopped checking his sugars while ago and he is not sure what they are looking like currently.  He reports he has been watching weeks.    Has no known issues with anesthesia.  No history of bleeding disorder or blood clots.            11/22/2024     1:46 PM   Additional Questions   Roomed by GUILLERMO Nolan   Accompanied by Cathy Wife               11/22/2024   Pre-Op Questionnaire   Have you ever had a heart attack or stroke? No    Have you ever had surgery on your heart or blood vessels, such as a stent placement, a coronary artery bypass, or surgery on an artery in your head, neck, heart, or legs? No    Do you have chest pain with activity? No    Do you have a history of heart failure? No    Do you currently have a cold, bronchitis or symptoms of other infection? No    Do you have a cough, shortness of breath, or wheezing? No    Do you or anyone in your family have previous history of blood clots? No    Do you or does anyone in your family have a serious bleeding problem such as prolonged bleeding following surgeries or cuts? No    Have you ever had problems with  anemia or been told to take iron pills? No    Have you had any abnormal blood loss such as black, tarry or bloody stools? No    Have you ever had a blood transfusion? No    Are you willing to have a blood transfusion if it is medically needed before, during, or after your surgery? Yes    Have you or any of your relatives ever had problems with anesthesia? No    Do you have sleep apnea, excessive snoring or daytime drowsiness? No    Do you have any artifical heart valves or other implanted medical devices like a pacemaker, defibrillator, or continuous glucose monitor? No    Do you have artificial joints? No    Are you allergic to latex? No        Patient-reported     Health Care Directive  Patient does not have a Health Care Directive: Discussed advance care planning with patient; however, patient declined at this time.    Preoperative Review of    reviewed - controlled substances reflected in medication list.        Patient Active Problem List    Diagnosis Date Noted    Diabetes mellitus, type 2 (H) 04/01/2024     Priority: Medium      No past medical history on file.  No past surgical history on file.  Current Outpatient Medications   Medication Sig Dispense Refill    atorvastatin (LIPITOR) 40 MG tablet Take 1 tablet (40 mg) by mouth daily 90 tablet 1    blood glucose (NO BRAND SPECIFIED) test strip Use to test blood sugar 1 times daily or as directed. To accompany: Blood Glucose Monitor Brands: per insurance. 100 strip 6    blood glucose monitoring (NO BRAND SPECIFIED) meter device kit Use to test blood sugar 1 times daily or as directed. Preferred blood glucose meter OR supplies to accompany: Blood Glucose Monitor Brands: per insurance. 1 kit 1    empagliflozin (JARDIANCE) 10 MG TABS tablet Take 1 tablet (10 mg) by mouth daily 90 tablet 1    metFORMIN (GLUCOPHAGE XR) 500 MG 24 hr tablet Take 1 tablet (500 mg) by mouth 2 times daily (with meals) 180 tablet 1    thin (NO BRAND SPECIFIED) lancets Use with  "lanceting device. To accompany: Blood Glucose Monitor Brands: per insurance. 100 each 6    vitamin D3 (CHOLECALCIFEROL) 50 mcg (2000 units) tablet Take 1 tablet (50 mcg) by mouth daily 90 tablet 1       No Known Allergies     Social History     Tobacco Use    Smoking status: Never     Passive exposure: Never    Smokeless tobacco: Never   Substance Use Topics    Alcohol use: Yes     History reviewed. No pertinent family history.  History   Drug Use Not on file             Review of Systems  CONSTITUTIONAL: NEGATIVE for fever, chills, change in weight  INTEGUMENTARY/SKIN: NEGATIVE for worrisome rashes, moles or lesions  EYES: NEGATIVE for vision changes or irritation  ENT/MOUTH: NEGATIVE for ear, mouth and throat problems  RESP: NEGATIVE for significant cough or SOB  BREAST: NEGATIVE for masses, tenderness or discharge  CV: NEGATIVE for chest pain, palpitations or peripheral edema  GI: NEGATIVE for nausea, abdominal pain, heartburn, or change in bowel habits  : NEGATIVE for frequency, dysuria, or hematuria  MUSCULOSKELETAL: NEGATIVE for significant arthralgias or myalgia  NEURO: NEGATIVE for weakness, dizziness or paresthesias  ENDOCRINE: NEGATIVE for temperature intolerance, skin/hair changes  HEME: NEGATIVE for bleeding problems  PSYCHIATRIC: NEGATIVE for changes in mood or affect    Objective    BP (!) 135/90 (BP Location: Right arm, Patient Position: Sitting, Cuff Size: Adult Large)   Pulse 82   Temp 97.4  F (36.3  C) (Oral)   Resp 20   Ht 1.659 m (5' 5.3\")   Wt 89.6 kg (197 lb 9.6 oz)   SpO2 99%   BMI 32.58 kg/m     Estimated body mass index is 32.58 kg/m  as calculated from the following:    Height as of this encounter: 1.659 m (5' 5.3\").    Weight as of this encounter: 89.6 kg (197 lb 9.6 oz).  Physical Exam  GENERAL: alert and no distress  EYES: Eyes grossly normal to inspection, PERRL and conjunctivae and sclerae normal  HENT: ear canals and TM's normal, nose and mouth without ulcers or " lesions  NECK: no adenopathy, no asymmetry, masses, or scars  RESP: lungs clear to auscultation - no rales, rhonchi or wheezes  CV: regular rate and rhythm, normal S1 S2, no S3 or S4, no murmur, click or rub, no peripheral edema  ABDOMEN: soft, nontender, no hepatosplenomegaly, no masses and bowel sounds normal  MS: no gross musculoskeletal defects noted, no edema  PSYCH: mentation appears normal, affect normal/bright    Recent Labs   Lab Test 06/06/24  1141 03/19/24  0802   HGB  --  15.7   PLT  --  280    135   POTASSIUM 3.8 4.3   CR 0.73 0.68   A1C 7.6* 13.6*        Diagnostics  Labs pending at this time.  Results will be reviewed when available.   No EKG required, no history of coronary heart disease, significant arrhythmia, peripheral arterial disease or other structural heart disease.    Revised Cardiac Risk Index (RCRI)  The patient has the following serious cardiovascular risks for perioperative complications:   - Diabetes Mellitus (on Insulin) = 1 point     RCRI Interpretation: 1 point: Class II (low risk - 0.9% complication rate)         Signed Electronically by: Estefaina Vega DO  A copy of this evaluation report is provided to the requesting physician.

## 2024-11-22 NOTE — H&P (VIEW-ONLY)
Preoperative Evaluation  [address] [address]  Phone: [phone]  Fax: [phone]  Primary Provider: Estefania Vega DO  Pre-op Performing Provider: Estefania Vega DO  Nov 22, 2024 11/22/2024   Surgical Information   What procedure is being done? herniorrhphy,ventral    Facility or Hospital where procedure/surgery will be performed: [facility]    Who is doing the procedure / surgery? shan bangura    Date of surgery / procedure: 12/12/24    Time of surgery / procedure: 11:00   Where do you plan to recover after surgery? at home with family        Patient-reported     Fax number for surgical facility: Note does not need to be faxed, will be available electronically in Epic.    Assessment & Plan     The proposed surgical procedure is considered INTERMEDIATE risk.       Diagnosis Comments   1. Preop general physical exam  CBC with platelets, Basic metabolic panel  (Ca, Cl, CO2, Creat, Gluc, K, Na, BUN)       2. Ventral hernia without obstruction or gangrene        3. Type 2 diabetes mellitus without complication, without long-term current use of insulin (H)  Hemoglobin A1c, empagliflozin (JARDIANCE) 10 MG TABS tablet, metFORMIN (GLUCOPHAGE XR) 500 MG 24 hr tablet, atorvastatin (LIPITOR) 40 MG tablet       4. Vitamin D deficiency  vitamin D3 (CHOLECALCIFEROL) 50 mcg (2000 units) tablet           Preop:  Hernia repair  RCRI1   No EKG indicated today  Will get CBC and BMP for anesthesia purposes  Will recheck A1c and let surgical team know so they can decide if they would like to move ahead with the surgery.   Medication management:  - Please hold Jardiance 3 days ahead of time  - Please hold metformin the day of surgery  - Continue atorvastatin on day of surgery  - Please hold vitamin D and all supplements 7 days ahead of time    Patient is tentatively cleared for surgery as long as the boodwork looks good       Patient  requesting med refills - provided      - No identified additional risk factors other than previously addressed      Recommendation  Approval given to proceed with proposed procedure pending review of diagnostic evaluation.    Astrid Rosas is a 39 year old, presenting for the following:  Pre-Op Exam (HERNIORRHAPHY, VENTRAL, ROBOT-ASSISTED, LAPAROSCOPIC, USING DA HERI XI )    Patient is here for preop for ventral hernia repair.  He was declined a ventral hernia repair earlier this year because his A1c 13.6.  Since then, he has been working on his diet and exercise.  A1c was improved at 7.6 about 5 months ago.    Today, patient reports that his abdominal hernia feels uncomfortable at times that does not cause any changes in bowel habits.  He is always able to retract it.  He has no bloody bowel movements.  No nausea or vomiting    Unfortunately, he stopped checking his sugars while ago and he is not sure what they are looking like currently.  He reports he has been watching weeks.    Has no known issues with anesthesia.  No history of bleeding disorder or blood clots.            11/22/2024     1:46 PM   Additional Questions   Roomed by GUILLERMO Nolan   Accompanied by Cathy Wife               11/22/2024   Pre-Op Questionnaire   Have you ever had a heart attack or stroke? No    Have you ever had surgery on your heart or blood vessels, such as a stent placement, a coronary artery bypass, or surgery on an artery in your head, neck, heart, or legs? No    Do you have chest pain with activity? No    Do you have a history of heart failure? No    Do you currently have a cold, bronchitis or symptoms of other infection? No    Do you have a cough, shortness of breath, or wheezing? No    Do you or anyone in your family have previous history of blood clots? No    Do you or does anyone in your family have a serious bleeding problem such as prolonged bleeding following surgeries or cuts? No    Have you ever had problems with  anemia or been told to take iron pills? No    Have you had any abnormal blood loss such as black, tarry or bloody stools? No    Have you ever had a blood transfusion? No    Are you willing to have a blood transfusion if it is medically needed before, during, or after your surgery? Yes    Have you or any of your relatives ever had problems with anesthesia? No    Do you have sleep apnea, excessive snoring or daytime drowsiness? No    Do you have any artifical heart valves or other implanted medical devices like a pacemaker, defibrillator, or continuous glucose monitor? No    Do you have artificial joints? No    Are you allergic to latex? No        Patient-reported     Health Care Directive  Patient does not have a Health Care Directive: Discussed advance care planning with patient; however, patient declined at this time.    Preoperative Review of    reviewed - controlled substances reflected in medication list.        Patient Active Problem List    Diagnosis Date Noted    Diabetes mellitus, type 2 (H) 04/01/2024     Priority: Medium      No past medical history on file.  No past surgical history on file.  Current Outpatient Medications   Medication Sig Dispense Refill    atorvastatin (LIPITOR) 40 MG tablet Take 1 tablet (40 mg) by mouth daily 90 tablet 1    blood glucose (NO BRAND SPECIFIED) test strip Use to test blood sugar 1 times daily or as directed. To accompany: Blood Glucose Monitor Brands: per insurance. 100 strip 6    blood glucose monitoring (NO BRAND SPECIFIED) meter device kit Use to test blood sugar 1 times daily or as directed. Preferred blood glucose meter OR supplies to accompany: Blood Glucose Monitor Brands: per insurance. 1 kit 1    empagliflozin (JARDIANCE) 10 MG TABS tablet Take 1 tablet (10 mg) by mouth daily 90 tablet 1    metFORMIN (GLUCOPHAGE XR) 500 MG 24 hr tablet Take 1 tablet (500 mg) by mouth 2 times daily (with meals) 180 tablet 1    thin (NO BRAND SPECIFIED) lancets Use with  "lanceting device. To accompany: Blood Glucose Monitor Brands: per insurance. 100 each 6    vitamin D3 (CHOLECALCIFEROL) 50 mcg (2000 units) tablet Take 1 tablet (50 mcg) by mouth daily 90 tablet 1       No Known Allergies     Social History     Tobacco Use    Smoking status: Never     Passive exposure: Never    Smokeless tobacco: Never   Substance Use Topics    Alcohol use: Yes     History reviewed. No pertinent family history.  History   Drug Use Not on file             Review of Systems  CONSTITUTIONAL: NEGATIVE for fever, chills, change in weight  INTEGUMENTARY/SKIN: NEGATIVE for worrisome rashes, moles or lesions  EYES: NEGATIVE for vision changes or irritation  ENT/MOUTH: NEGATIVE for ear, mouth and throat problems  RESP: NEGATIVE for significant cough or SOB  BREAST: NEGATIVE for masses, tenderness or discharge  CV: NEGATIVE for chest pain, palpitations or peripheral edema  GI: NEGATIVE for nausea, abdominal pain, heartburn, or change in bowel habits  : NEGATIVE for frequency, dysuria, or hematuria  MUSCULOSKELETAL: NEGATIVE for significant arthralgias or myalgia  NEURO: NEGATIVE for weakness, dizziness or paresthesias  ENDOCRINE: NEGATIVE for temperature intolerance, skin/hair changes  HEME: NEGATIVE for bleeding problems  PSYCHIATRIC: NEGATIVE for changes in mood or affect    Objective    BP (!) 135/90 (BP Location: Right arm, Patient Position: Sitting, Cuff Size: Adult Large)   Pulse 82   Temp 97.4  F (36.3  C) (Oral)   Resp 20   Ht 1.659 m (5' 5.3\")   Wt 89.6 kg (197 lb 9.6 oz)   SpO2 99%   BMI 32.58 kg/m     Estimated body mass index is 32.58 kg/m  as calculated from the following:    Height as of this encounter: 1.659 m (5' 5.3\").    Weight as of this encounter: 89.6 kg (197 lb 9.6 oz).  Physical Exam  GENERAL: alert and no distress  EYES: Eyes grossly normal to inspection, PERRL and conjunctivae and sclerae normal  HENT: ear canals and TM's normal, nose and mouth without ulcers or " lesions  NECK: no adenopathy, no asymmetry, masses, or scars  RESP: lungs clear to auscultation - no rales, rhonchi or wheezes  CV: regular rate and rhythm, normal S1 S2, no S3 or S4, no murmur, click or rub, no peripheral edema  ABDOMEN: soft, nontender, no hepatosplenomegaly, no masses and bowel sounds normal  MS: no gross musculoskeletal defects noted, no edema  PSYCH: mentation appears normal, affect normal/bright    Recent Labs   Lab Test 06/06/24  1141 03/19/24  0802   HGB  --  15.7   PLT  --  280    135   POTASSIUM 3.8 4.3   CR 0.73 0.68   A1C 7.6* 13.6*        Diagnostics  Labs pending at this time.  Results will be reviewed when available.   No EKG required, no history of coronary heart disease, significant arrhythmia, peripheral arterial disease or other structural heart disease.    Revised Cardiac Risk Index (RCRI)  The patient has the following serious cardiovascular risks for perioperative complications:   - Diabetes Mellitus (on Insulin) = 1 point     RCRI Interpretation: 1 point: Class II (low risk - 0.9% complication rate)         Signed Electronically by: Estefania Vega DO  A copy of this evaluation report is provided to the requesting physician.

## 2024-12-11 ENCOUNTER — ANESTHESIA EVENT (OUTPATIENT)
Dept: SURGERY | Facility: HOSPITAL | Age: 39
End: 2024-12-11
Payer: COMMERCIAL

## 2024-12-12 ENCOUNTER — ANESTHESIA (OUTPATIENT)
Dept: SURGERY | Facility: HOSPITAL | Age: 39
End: 2024-12-12
Payer: COMMERCIAL

## 2024-12-12 ENCOUNTER — HOSPITAL ENCOUNTER (OUTPATIENT)
Facility: HOSPITAL | Age: 39
Discharge: HOME | End: 2024-12-12
Attending: SPECIALIST | Admitting: SPECIALIST
Payer: COMMERCIAL

## 2024-12-12 VITALS
SYSTOLIC BLOOD PRESSURE: 111 MMHG | TEMPERATURE: 98.2 F | OXYGEN SATURATION: 96 % | DIASTOLIC BLOOD PRESSURE: 62 MMHG | RESPIRATION RATE: 17 BRPM | HEART RATE: 87 BPM | HEIGHT: 66 IN | BODY MASS INDEX: 30.81 KG/M2 | WEIGHT: 191.7 LBS

## 2024-12-12 DIAGNOSIS — E11.9 TYPE 2 DIABETES MELLITUS WITHOUT COMPLICATION, WITHOUT LONG-TERM CURRENT USE OF INSULIN (H): Primary | ICD-10-CM

## 2024-12-12 DIAGNOSIS — K43.9 VENTRAL HERNIA WITHOUT OBSTRUCTION OR GANGRENE: ICD-10-CM

## 2024-12-12 LAB — GLUCOSE BLDC GLUCOMTR-MCNC: 185 MG/DL (ref 70–99)

## 2024-12-12 PROCEDURE — 250N000013 HC RX MED GY IP 250 OP 250 PS 637: Performed by: STUDENT IN AN ORGANIZED HEALTH CARE EDUCATION/TRAINING PROGRAM

## 2024-12-12 PROCEDURE — 710N000009 HC RECOVERY PHASE 1, LEVEL 1, PER MIN: Performed by: SPECIALIST

## 2024-12-12 PROCEDURE — S2900 ROBOTIC SURGICAL SYSTEM: HCPCS | Performed by: SPECIALIST

## 2024-12-12 PROCEDURE — 250N000011 HC RX IP 250 OP 636: Performed by: NURSE ANESTHETIST, CERTIFIED REGISTERED

## 2024-12-12 PROCEDURE — 360N000080 HC SURGERY LEVEL 7, PER MIN: Performed by: SPECIALIST

## 2024-12-12 PROCEDURE — 82962 GLUCOSE BLOOD TEST: CPT

## 2024-12-12 PROCEDURE — 250N000025 HC SEVOFLURANE, PER MIN: Performed by: SPECIALIST

## 2024-12-12 PROCEDURE — 710N000012 HC RECOVERY PHASE 2, PER MINUTE: Performed by: SPECIALIST

## 2024-12-12 PROCEDURE — 250N000011 HC RX IP 250 OP 636: Mod: JZ | Performed by: SPECIALIST

## 2024-12-12 PROCEDURE — 250N000011 HC RX IP 250 OP 636: Performed by: STUDENT IN AN ORGANIZED HEALTH CARE EDUCATION/TRAINING PROGRAM

## 2024-12-12 PROCEDURE — 49591 RPR AA HRN 1ST < 3 CM RDC: CPT | Performed by: SPECIALIST

## 2024-12-12 PROCEDURE — 258N000003 HC RX IP 258 OP 636: Performed by: STUDENT IN AN ORGANIZED HEALTH CARE EDUCATION/TRAINING PROGRAM

## 2024-12-12 PROCEDURE — 250N000009 HC RX 250: Performed by: NURSE ANESTHETIST, CERTIFIED REGISTERED

## 2024-12-12 PROCEDURE — 272N000001 HC OR GENERAL SUPPLY STERILE: Performed by: SPECIALIST

## 2024-12-12 PROCEDURE — 258N000003 HC RX IP 258 OP 636: Performed by: NURSE ANESTHETIST, CERTIFIED REGISTERED

## 2024-12-12 PROCEDURE — 250N000009 HC RX 250: Performed by: SPECIALIST

## 2024-12-12 PROCEDURE — 370N000017 HC ANESTHESIA TECHNICAL FEE, PER MIN: Performed by: SPECIALIST

## 2024-12-12 PROCEDURE — 250N000009 HC RX 250: Performed by: STUDENT IN AN ORGANIZED HEALTH CARE EDUCATION/TRAINING PROGRAM

## 2024-12-12 PROCEDURE — C1781 MESH (IMPLANTABLE): HCPCS | Performed by: SPECIALIST

## 2024-12-12 PROCEDURE — 999N000141 HC STATISTIC PRE-PROCEDURE NURSING ASSESSMENT: Performed by: SPECIALIST

## 2024-12-12 DEVICE — COMPOSITE MESH MONOFILAMENT POLYPROPYLENE MESH WITH ABSORBABLE SYNTHETIC FILM AND MARKING
Type: IMPLANTABLE DEVICE | Site: ABDOMEN | Status: FUNCTIONAL
Brand: PARIETENE DS

## 2024-12-12 RX ORDER — BUPIVACAINE HYDROCHLORIDE 2.5 MG/ML
INJECTION, SOLUTION EPIDURAL; INFILTRATION; INTRACAUDAL PRN
Status: DISCONTINUED | OUTPATIENT
Start: 2024-12-12 | End: 2024-12-12 | Stop reason: HOSPADM

## 2024-12-12 RX ORDER — NALOXONE HYDROCHLORIDE 1 MG/ML
0.1 INJECTION INTRAMUSCULAR; INTRAVENOUS; SUBCUTANEOUS
Status: DISCONTINUED | OUTPATIENT
Start: 2024-12-12 | End: 2024-12-12 | Stop reason: HOSPADM

## 2024-12-12 RX ORDER — ONDANSETRON 2 MG/ML
INJECTION INTRAMUSCULAR; INTRAVENOUS PRN
Status: DISCONTINUED | OUTPATIENT
Start: 2024-12-12 | End: 2024-12-12

## 2024-12-12 RX ORDER — FENTANYL CITRATE 50 UG/ML
50 INJECTION, SOLUTION INTRAMUSCULAR; INTRAVENOUS EVERY 5 MIN PRN
Status: DISCONTINUED | OUTPATIENT
Start: 2024-12-12 | End: 2024-12-12 | Stop reason: HOSPADM

## 2024-12-12 RX ORDER — ONDANSETRON 4 MG/1
4 TABLET, ORALLY DISINTEGRATING ORAL EVERY 30 MIN PRN
Status: DISCONTINUED | OUTPATIENT
Start: 2024-12-12 | End: 2024-12-12 | Stop reason: HOSPADM

## 2024-12-12 RX ORDER — DEXAMETHASONE SODIUM PHOSPHATE 4 MG/ML
4 INJECTION, SOLUTION INTRA-ARTICULAR; INTRALESIONAL; INTRAMUSCULAR; INTRAVENOUS; SOFT TISSUE
Status: DISCONTINUED | OUTPATIENT
Start: 2024-12-12 | End: 2024-12-12 | Stop reason: HOSPADM

## 2024-12-12 RX ORDER — LIDOCAINE 40 MG/G
CREAM TOPICAL
Status: DISCONTINUED | OUTPATIENT
Start: 2024-12-12 | End: 2024-12-12 | Stop reason: HOSPADM

## 2024-12-12 RX ORDER — SODIUM CHLORIDE, SODIUM LACTATE, POTASSIUM CHLORIDE, CALCIUM CHLORIDE 600; 310; 30; 20 MG/100ML; MG/100ML; MG/100ML; MG/100ML
INJECTION, SOLUTION INTRAVENOUS CONTINUOUS
Status: DISCONTINUED | OUTPATIENT
Start: 2024-12-12 | End: 2024-12-12 | Stop reason: HOSPADM

## 2024-12-12 RX ORDER — OXYCODONE HYDROCHLORIDE 5 MG/1
5 TABLET ORAL
Status: COMPLETED | OUTPATIENT
Start: 2024-12-12 | End: 2024-12-12

## 2024-12-12 RX ORDER — NALOXONE HYDROCHLORIDE 0.4 MG/ML
0.1 INJECTION, SOLUTION INTRAMUSCULAR; INTRAVENOUS; SUBCUTANEOUS
Status: DISCONTINUED | OUTPATIENT
Start: 2024-12-12 | End: 2024-12-12 | Stop reason: HOSPADM

## 2024-12-12 RX ORDER — DEXAMETHASONE SODIUM PHOSPHATE 10 MG/ML
4 INJECTION, SOLUTION INTRAMUSCULAR; INTRAVENOUS
Status: DISCONTINUED | OUTPATIENT
Start: 2024-12-12 | End: 2024-12-12 | Stop reason: HOSPADM

## 2024-12-12 RX ORDER — MAGNESIUM SULFATE 4 G/50ML
4 INJECTION INTRAVENOUS ONCE
Status: COMPLETED | OUTPATIENT
Start: 2024-12-12 | End: 2024-12-12

## 2024-12-12 RX ORDER — DEXAMETHASONE SODIUM PHOSPHATE 10 MG/ML
INJECTION, SOLUTION INTRAMUSCULAR; INTRAVENOUS PRN
Status: DISCONTINUED | OUTPATIENT
Start: 2024-12-12 | End: 2024-12-12

## 2024-12-12 RX ORDER — CEFAZOLIN SODIUM/WATER 2 G/20 ML
SYRINGE (ML) INTRAVENOUS PRN
Status: DISCONTINUED | OUTPATIENT
Start: 2024-12-12 | End: 2024-12-12

## 2024-12-12 RX ORDER — FENTANYL CITRATE 50 UG/ML
INJECTION, SOLUTION INTRAMUSCULAR; INTRAVENOUS PRN
Status: DISCONTINUED | OUTPATIENT
Start: 2024-12-12 | End: 2024-12-12

## 2024-12-12 RX ORDER — FENTANYL CITRATE 50 UG/ML
25 INJECTION, SOLUTION INTRAMUSCULAR; INTRAVENOUS EVERY 5 MIN PRN
Status: DISCONTINUED | OUTPATIENT
Start: 2024-12-12 | End: 2024-12-12 | Stop reason: HOSPADM

## 2024-12-12 RX ORDER — OXYCODONE HYDROCHLORIDE 5 MG/1
10 TABLET ORAL
Status: DISCONTINUED | OUTPATIENT
Start: 2024-12-12 | End: 2024-12-12 | Stop reason: HOSPADM

## 2024-12-12 RX ORDER — KETOROLAC TROMETHAMINE 30 MG/ML
INJECTION, SOLUTION INTRAMUSCULAR; INTRAVENOUS PRN
Status: DISCONTINUED | OUTPATIENT
Start: 2024-12-12 | End: 2024-12-12

## 2024-12-12 RX ORDER — OXYCODONE AND ACETAMINOPHEN 5; 325 MG/1; MG/1
1-2 TABLET ORAL EVERY 6 HOURS PRN
Qty: 18 TABLET | Refills: 0 | Status: SHIPPED | OUTPATIENT
Start: 2024-12-12 | End: 2024-12-15

## 2024-12-12 RX ORDER — PROPOFOL 10 MG/ML
INJECTION, EMULSION INTRAVENOUS PRN
Status: DISCONTINUED | OUTPATIENT
Start: 2024-12-12 | End: 2024-12-12

## 2024-12-12 RX ORDER — ONDANSETRON 2 MG/ML
4 INJECTION INTRAMUSCULAR; INTRAVENOUS EVERY 30 MIN PRN
Status: DISCONTINUED | OUTPATIENT
Start: 2024-12-12 | End: 2024-12-12 | Stop reason: HOSPADM

## 2024-12-12 RX ADMIN — ONDANSETRON 4 MG: 2 INJECTION INTRAMUSCULAR; INTRAVENOUS at 11:57

## 2024-12-12 RX ADMIN — Medication 80 MG: at 11:05

## 2024-12-12 RX ADMIN — PROPOFOL 200 MG: 10 INJECTION, EMULSION INTRAVENOUS at 11:02

## 2024-12-12 RX ADMIN — DEXAMETHASONE SODIUM PHOSPHATE 10 MG: 10 INJECTION, SOLUTION INTRAMUSCULAR; INTRAVENOUS at 11:02

## 2024-12-12 RX ADMIN — ROCURONIUM BROMIDE 20 MG: 50 INJECTION, SOLUTION INTRAVENOUS at 11:25

## 2024-12-12 RX ADMIN — SUGAMMADEX 180 MG: 100 INJECTION, SOLUTION INTRAVENOUS at 12:08

## 2024-12-12 RX ADMIN — ROCURONIUM BROMIDE 50 MG: 50 INJECTION, SOLUTION INTRAVENOUS at 11:02

## 2024-12-12 RX ADMIN — FENTANYL CITRATE 50 MCG: 50 INJECTION, SOLUTION INTRAMUSCULAR; INTRAVENOUS at 10:51

## 2024-12-12 RX ADMIN — KETOROLAC TROMETHAMINE 15 MG: 30 INJECTION, SOLUTION INTRAMUSCULAR at 11:57

## 2024-12-12 RX ADMIN — MIDAZOLAM HYDROCHLORIDE 2 MG: 1 INJECTION, SOLUTION INTRAMUSCULAR; INTRAVENOUS at 10:51

## 2024-12-12 RX ADMIN — Medication 2 G: at 11:02

## 2024-12-12 RX ADMIN — LIDOCAINE HYDROCHLORIDE 5 ML: 10 INJECTION, SOLUTION INFILTRATION; PERINEURAL at 11:02

## 2024-12-12 RX ADMIN — SODIUM CHLORIDE, POTASSIUM CHLORIDE, SODIUM LACTATE AND CALCIUM CHLORIDE: 600; 310; 30; 20 INJECTION, SOLUTION INTRAVENOUS at 10:42

## 2024-12-12 RX ADMIN — OXYCODONE HYDROCHLORIDE 5 MG: 5 TABLET ORAL at 14:02

## 2024-12-12 RX ADMIN — MAGNESIUM SULFATE HEPTAHYDRATE 4 G: 80 INJECTION, SOLUTION INTRAVENOUS at 10:42

## 2024-12-12 RX ADMIN — SODIUM CHLORIDE, POTASSIUM CHLORIDE, SODIUM LACTATE AND CALCIUM CHLORIDE: 600; 310; 30; 20 INJECTION, SOLUTION INTRAVENOUS at 11:02

## 2024-12-12 RX ADMIN — HYDROMORPHONE HYDROCHLORIDE 1 MG: 1 INJECTION, SOLUTION INTRAMUSCULAR; INTRAVENOUS; SUBCUTANEOUS at 11:47

## 2024-12-12 RX ADMIN — PHENYLEPHRINE HYDROCHLORIDE 100 MCG: 10 INJECTION INTRAVENOUS at 11:30

## 2024-12-12 ASSESSMENT — ACTIVITIES OF DAILY LIVING (ADL)
ADLS_ACUITY_SCORE: 15
ADLS_ACUITY_SCORE: 15
ADLS_ACUITY_SCORE: 41
ADLS_ACUITY_SCORE: 15

## 2024-12-12 NOTE — ANESTHESIA PROCEDURE NOTES
Airway       Patient location during procedure: OR       Procedure Start/Stop Times: 12/12/2024 11:06 AM  Staff -        Anesthesiologist:  Golden Moses MD       CRNA: Brdoy Antonio APRN CRNA       Other Anesthesia Staff: Shweta Hodge       Performed By: SWATHI  Consent for Airway        Urgency: elective  Indications and Patient Condition       Indications for airway management: geovani-procedural       Induction type:intravenous       Mask difficulty assessment: 2 - vent by mask + OA or adjuvant +/- NMBA    Final Airway Details       Final airway type: endotracheal airway       Successful airway: ETT - single  Endotracheal Airway Details        ETT size (mm): 7.5       Cuffed: yes       Cuff volume (mL): 7       Successful intubation technique: direct laryngoscopy       DL Blade Type: MAC 3       Grade View of Cords: 1       Adjucts: stylet       Position: Right       Measured from: lips       Secured at (cm): 22       Bite block used: None    Post intubation assessment        Placement verified by: capnometry, equal breath sounds and chest rise        Number of attempts at approach: 2 (SWATHI learning)       Number of other approaches attempted: 0       Secured with: tape       Ease of procedure: easy       Dentition: Intact and Unchanged    Medication(s) Administered   Medication Administration Time: 12/12/2024 11:06 AM

## 2024-12-12 NOTE — ANESTHESIA CARE TRANSFER NOTE
Patient: Ralph Soto    Procedure: Procedure(s):  HERNIORRHAPHY, VENTRAL, ROBOT-ASSISTED, LAPAROSCOPIC, USING DA HERI XI       Diagnosis: Ventral hernia without obstruction or gangrene [K43.9]  Diagnosis Additional Information: No value filed.    Anesthesia Type:   General     Note:    Oropharynx: oropharynx clear of all foreign objects and spontaneously breathing  Level of Consciousness: drowsy  Oxygen Supplementation: face mask  Level of Supplemental Oxygen (L/min / FiO2): 8  Independent Airway: airway patency satisfactory and stable  Dentition: dentition unchanged  Vital Signs Stable: post-procedure vital signs reviewed and stable  Report to RN Given: handoff report given  Patient transferred to: PACU    Handoff Report: Identifed the Patient, Identified the Reponsible Provider, Reviewed the pertinent medical history, Discussed the surgical course, Reviewed Intra-OP anesthesia mangement and issues during anesthesia, Set expectations for post-procedure period and Allowed opportunity for questions and acknowledgement of understanding  Vitals:  Vitals Value Taken Time   /77 12/12/24 1216   Temp     Pulse 80 12/12/24 1221   Resp     SpO2 98 % 12/12/24 1221   Vitals shown include unfiled device data.    Electronically Signed By: KAT Hope CRNA  December 12, 2024  12:23 PM

## 2024-12-12 NOTE — OP NOTE
Operative Note    Name:  Ralph Soto  PCP:  Estefania Vega  Procedure Date:  12/12/2024      Procedure(s):  HERNIORRHAPHY, VENTRAL, ROBOT-ASSISTED, LAPAROSCOPIC, USING DA HERI XI    Pre-Procedure Diagnosis:  Ventral hernia without obstruction or gangrene [K43.9]    Post-Procedure Diagnosis:    ventral hernia    OR staff:  Surgeon(s):  Rei Pierce MD    Circulator: Kady Navarro RN; Rogelio Best RN  Relief Scrub: Hanna Ruiz  Scrub Person: Marilin Miller      Anesthesia Type:  General    Past Medical History:   Diagnosis Date    Diabetes (H)     Ventral hernia without obstruction or gangrene     Vitamin deficiency        Patient Active Problem List    Diagnosis Date Noted    Diabetes mellitus, type 2 (H) 04/01/2024     Priority: Medium       Findings:    3 cm defect    Primarily and I used a 12 cm overlay mesh a prior Mat dual layer    Operative Report:    Consent was obtained.  The patient was taken to the operating room and placed in a supine position.  General anesthesia was administered by the anesthesia staff.  Lewis is placed in Zavzpret and draped sterile manner.  A briefing time was performed anesthesia and our staff.  I made an incision in the subcostal margin left side used a 5 mm scope and a 5 mm Visiport to gain access to peritoneal cavity insufflated pressure 15 mL mercury CO2 upon doing so I treated placed a robotic trocar in the mid abdomen lateral just above the clock: Under direct visualization and then 1 in the left lower quadrant.  These were 8 mm robotic trocars I then changed out my 5 mm trocar to a robotic trocar under direct realization I then docked the robot and changed my position to the consult.  At this time point he had a he had omentum and fat quite a bit which was stuck in his hernia took me a while to reduce this but I was able to do so once I reduced all this made sure good hemostasis and then just plan for repair at this time plan to do a primary  repair with the overlay mesh.  Intra-abdominal he had placed a 12 cm prior Mat mesh along with the 2-0 absorbable V-Loc and a oh permanent V-Loc suture.  I sutured the defect closed with a oh V-Loc in 2 layers pulling the hernia sac down to incorporate into the repair and then placed the mesh and suture on the periphery of the mesh with a 2-0 absorbable upon completion remove the needles checked for hemostasis things look good and I was happy with my mesh placement.  We remove trocars.  I closed all incision with a 4 Monocryl subcuticular stitch.  30 Marcaine injected incisions in the field block of the surgical area.  I then Steri-Strips Ultracin applied extubated transferred PACU in stable condition Lewis was removed ice but abdominal binder all sponge needle counts are correct.        Estimated Blood Loss: 5 mL from 12/12/2024 10:50 AM to 12/12/2024 12:13 PM    Specimens:    none       Drains:   none    Complications:    None    Rei Pierce MD     Date: 12/12/2024  Time: 1:02 PM

## 2024-12-12 NOTE — ANESTHESIA PREPROCEDURE EVALUATION
Anesthesia Pre-Procedure Evaluation    Patient: Ralph Soto   MRN: [id number] : 1985        Procedure : Procedure(s):  HERNIORRHAPHY, VENTRAL, ROBOT-ASSISTED, LAPAROSCOPIC, USING DA HERI XI          Past Medical History:   Diagnosis Date    Diabetes (H)     Ventral hernia without obstruction or gangrene     Vitamin deficiency       No past surgical history on file.   No Known Allergies   Social History     Tobacco Use    Smoking status: Never     Passive exposure: Never    Smokeless tobacco: Never   Substance Use Topics    Alcohol use: Yes      Wt Readings from Last 1 Encounters:   24 87 kg (191 lb 11.2 oz)        Anesthesia Evaluation   Pt has not had prior anesthetic         ROS/MED HX  ENT/Pulmonary:  - neg pulmonary ROS   (+)     ANITRA risk factors (Recommended discussing sleep study with PCP), snores loudly,    observed stopped breathing,                              Neurologic:  - neg neurologic ROS     Cardiovascular:  - neg cardiovascular ROS     METS/Exercise Tolerance: >4 METS    Hematologic:  - neg hematologic  ROS     Musculoskeletal:  - neg musculoskeletal ROS     GI/Hepatic:     (+) GERD (Well controlled, not on meds),                   Renal/Genitourinary:  - neg Renal ROS     Endo:     (+)  type II DM (Jardiance, Metformin), Last HgA1c: 9.0, date: 24,  - not using insulin pump.                Psychiatric/Substance Use:  - neg psychiatric ROS     Infectious Disease:  - neg infectious disease ROS     Malignancy:  - neg malignancy ROS     Other:            Physical Exam    Airway  airway exam normal      Mallampati: II   TM distance: > 3 FB   Neck ROM: full   Mouth opening: > 3 cm    Respiratory Devices and Support         Dental       (+) Completely normal teeth      Cardiovascular   cardiovascular exam normal       Rhythm and rate: regular and tachycardia     Pulmonary   pulmonary exam normal        breath sounds clear to auscultation           OUTSIDE LABS:  CBC:   Lab Results  "  Component Value Date    WBC 10.2 11/22/2024    WBC 6.8 03/19/2024    HGB 15.2 11/22/2024    HGB 15.7 03/19/2024    HCT 44.3 11/22/2024    HCT 44.4 03/19/2024     11/22/2024     03/19/2024     BMP:   Lab Results   Component Value Date     11/22/2024     06/06/2024    POTASSIUM 3.7 11/22/2024    POTASSIUM 3.8 06/06/2024    CHLORIDE 101 11/22/2024    CHLORIDE 105 06/06/2024    CO2 24 11/22/2024    CO2 23 06/06/2024    BUN 14.6 11/22/2024    BUN 12.3 06/06/2024    CR 0.79 11/22/2024    CR 0.73 06/06/2024     (H) 11/22/2024     (H) 06/06/2024     COAGS: No results found for: \"PTT\", \"INR\", \"FIBR\"  POC: No results found for: \"BGM\", \"HCG\", \"HCGS\"  HEPATIC:   Lab Results   Component Value Date    ALBUMIN 4.3 06/06/2024    PROTTOTAL 7.7 06/06/2024    ALT 22 06/06/2024    AST 18 06/06/2024    ALKPHOS 55 06/06/2024    BILITOTAL 0.5 06/06/2024     OTHER:   Lab Results   Component Value Date    A1C 9.0 (H) 11/22/2024    CAROL 9.8 11/22/2024    TSH 1.39 03/19/2024       Anesthesia Plan    ASA Status:  3    NPO Status:  NPO Appropriate    Anesthesia Type: General.     - Airway: ETT   Induction: RSI.   Maintenance: Inhalation.        Consents    Anesthesia Plan(s) and associated risks, benefits, and realistic alternatives discussed. Questions answered and patient/representative(s) expressed understanding.     - Discussed: Risks, Benefits and Alternatives for BOTH SEDATION and the PROCEDURE were discussed     - Discussed with:  Patient, Spouse,             Postoperative Care    Pain management: IV analgesics, Multi-modal analgesia.   PONV prophylaxis: Ondansetron (or other 5HT-3)     Comments:               Golden Moses MD    I have reviewed the pertinent notes and labs in the chart from the past 30 days and (re)examined the patient.  Any updates or changes from those notes are reflected in this note.                        # DMII: A1C = 9.0 % (Ref range: 0.0 - 5.6 %) within past 6 " "months    # Obesity: Estimated body mass index is 30.94 kg/m  as calculated from the following:    Height as of this encounter: 1.676 m (5' 6\").    Weight as of this encounter: 87 kg (191 lb 11.2 oz).             "

## 2024-12-12 NOTE — INTERVAL H&P NOTE
"I have reviewed the surgical (or preoperative) H&P that is linked to this encounter, and examined the patient. There are no significant changes.        Rei Pierce MD  General Surgery [phone]  Vascular Surgery [phone]          Clinical Conditions Present on Arrival:  Clinically Significant Risk Factors Present on Admission                       # DMII: A1C = 9.0 % (Ref range: 0.0 - 5.6 %) within past 6 months  # Obesity: Estimated body mass index is 30.94 kg/m  as calculated from the following:    Height as of this encounter: 1.676 m (5' 6\").    Weight as of this encounter: 87 kg (191 lb 11.2 oz).       "

## 2025-03-09 ENCOUNTER — MYC REFILL (OUTPATIENT)
Dept: FAMILY MEDICINE | Facility: CLINIC | Age: 40
End: 2025-03-09
Payer: COMMERCIAL

## 2025-03-09 DIAGNOSIS — E55.9 VITAMIN D DEFICIENCY: ICD-10-CM

## 2025-03-09 DIAGNOSIS — E11.9 TYPE 2 DIABETES MELLITUS WITHOUT COMPLICATION, WITHOUT LONG-TERM CURRENT USE OF INSULIN (H): ICD-10-CM

## 2025-03-09 DIAGNOSIS — E11.65 UNCONTROLLED TYPE 2 DIABETES MELLITUS WITH HYPERGLYCEMIA (H): ICD-10-CM

## 2025-03-10 RX ORDER — CHOLECALCIFEROL (VITAMIN D3) 50 MCG
1 TABLET ORAL DAILY
Qty: 90 TABLET | Refills: 1 | Status: SHIPPED | OUTPATIENT
Start: 2025-03-10

## 2025-03-10 RX ORDER — METFORMIN HYDROCHLORIDE 500 MG/1
500 TABLET, EXTENDED RELEASE ORAL 2 TIMES DAILY WITH MEALS
Qty: 90 TABLET | Refills: 0 | Status: SHIPPED | OUTPATIENT
Start: 2025-03-10

## 2025-03-10 RX ORDER — LANCETS
EACH MISCELLANEOUS
Qty: 100 EACH | Refills: 1 | Status: SHIPPED | OUTPATIENT
Start: 2025-03-10

## 2025-03-11 ENCOUNTER — TELEPHONE (OUTPATIENT)
Dept: FAMILY MEDICINE | Facility: CLINIC | Age: 40
End: 2025-03-11
Payer: COMMERCIAL

## 2025-03-11 RX ORDER — ATORVASTATIN CALCIUM 40 MG/1
40 TABLET, FILM COATED ORAL DAILY
Qty: 90 TABLET | Refills: 1 | OUTPATIENT
Start: 2025-03-11

## 2025-03-13 NOTE — TELEPHONE ENCOUNTER
[facility] Prior Authorization Team   Phone: [phone]    PA Initiation    Medication: JARDIANCE 10 MG PO TABS  Insurance Company: [employer] - Phone [phone] Fax [phone]  Pharmacy Filling the Rx: [place], [place] - [address] [facility]  Filling Pharmacy Phone: [phone]  Filling Pharmacy Fax:    Start Date: 3/13/2025    Started PA on CMM and a response of Prior Authorization not required for patient/medication.  Pharmacy was able to get a paid claim.  Patient has copay of $25.

## 2025-03-16 ENCOUNTER — HEALTH MAINTENANCE LETTER (OUTPATIENT)
Age: 40
End: 2025-03-16

## 2025-04-20 ENCOUNTER — MYC REFILL (OUTPATIENT)
Dept: FAMILY MEDICINE | Facility: CLINIC | Age: 40
End: 2025-04-20
Payer: COMMERCIAL

## 2025-04-20 DIAGNOSIS — E11.65 UNCONTROLLED TYPE 2 DIABETES MELLITUS WITH HYPERGLYCEMIA (H): ICD-10-CM

## 2025-04-20 DIAGNOSIS — E11.9 TYPE 2 DIABETES MELLITUS WITHOUT COMPLICATION, WITHOUT LONG-TERM CURRENT USE OF INSULIN (H): ICD-10-CM

## 2025-04-20 DIAGNOSIS — E55.9 VITAMIN D DEFICIENCY: ICD-10-CM

## 2025-04-21 RX ORDER — CHOLECALCIFEROL (VITAMIN D3) 50 MCG
1 TABLET ORAL DAILY
Qty: 90 TABLET | Refills: 1 | OUTPATIENT
Start: 2025-04-21

## 2025-04-21 RX ORDER — ATORVASTATIN CALCIUM 40 MG/1
40 TABLET, FILM COATED ORAL DAILY
Qty: 90 TABLET | Refills: 1 | OUTPATIENT
Start: 2025-04-21

## 2025-05-09 PROBLEM — R06.83 SNORING: Status: ACTIVE | Noted: 2025-05-09

## 2025-05-09 PROBLEM — E55.9 VITAMIN D DEFICIENCY: Status: ACTIVE | Noted: 2025-05-09

## 2025-05-09 PROBLEM — Z98.890 HISTORY OF VENTRAL HERNIA REPAIR: Status: ACTIVE | Noted: 2025-05-09

## 2025-05-09 PROBLEM — Z87.19 HISTORY OF VENTRAL HERNIA REPAIR: Status: ACTIVE | Noted: 2025-05-09

## 2025-05-09 PROBLEM — R06.81 APNEA: Status: ACTIVE | Noted: 2025-05-09

## 2025-05-09 PROBLEM — E66.3 OVERWEIGHT: Status: ACTIVE | Noted: 2025-05-09

## 2025-05-12 ENCOUNTER — PATIENT OUTREACH (OUTPATIENT)
Dept: CARE COORDINATION | Facility: CLINIC | Age: 40
End: 2025-05-12
Payer: COMMERCIAL

## 2025-05-12 ENCOUNTER — RESULTS FOLLOW-UP (OUTPATIENT)
Dept: FAMILY MEDICINE | Facility: CLINIC | Age: 40
End: 2025-05-12

## 2025-05-12 DIAGNOSIS — E11.9 TYPE 2 DIABETES MELLITUS WITHOUT COMPLICATION, WITHOUT LONG-TERM CURRENT USE OF INSULIN (H): ICD-10-CM

## 2025-05-12 RX ORDER — ATORVASTATIN CALCIUM 80 MG/1
80 TABLET, FILM COATED ORAL DAILY
Qty: 90 TABLET | Refills: 1 | Status: SHIPPED | OUTPATIENT
Start: 2025-05-12

## 2025-05-14 ENCOUNTER — PATIENT OUTREACH (OUTPATIENT)
Dept: CARE COORDINATION | Facility: CLINIC | Age: 40
End: 2025-05-14
Payer: COMMERCIAL

## 2025-08-10 ENCOUNTER — HEALTH MAINTENANCE LETTER (OUTPATIENT)
Age: 40
End: 2025-08-10

## (undated) DEVICE — DAVINCI HOT SHEARS TIP COVER  400180

## (undated) DEVICE — CATH TRAY FOLEY SURESTEP 16FR DRAIN BAG STATOCK A899916

## (undated) DEVICE — ENDO TROCAR FIRST ENTRY KII FIOS Z-THRD 05X100MM CTF03

## (undated) DEVICE — DAVINCI XI OBTURATOR BLADELESS 8MM 470359

## (undated) DEVICE — SU MONOCRYL+ 4-0 18IN PS2 UND MCP496G

## (undated) DEVICE — POSITIONING KIT THE PINK PAD XL 40X20X1IN 40583 (COI)

## (undated) DEVICE — DRAPE SHEET REV FOLD 3/4 9349

## (undated) DEVICE — DRAPE SHEET TABLE COVER KC 42301*

## (undated) DEVICE — SYR 50ML SLIP TIP W/O NDL 309654

## (undated) DEVICE — DAVINCI XI SEAL UNIVERSAL 5-12MM 470500

## (undated) DEVICE — DAVINCI XI DRAPE COLUMN 470341

## (undated) DEVICE — LUBRICANT INST ELECTROLUBE EL101

## (undated) DEVICE — DAVINCI XI DRAPE ARM 470015

## (undated) DEVICE — TUBING SMOKE EVAC PNEUMOCLEAR HIGH FLOW 0620050250

## (undated) DEVICE — CUSTOM PACK LAP CHOLE SBA5BLCHEA

## (undated) DEVICE — SU WND CLOSURE V-LOC 90 SZ 2-0 12" GS-21 VLOCM0315

## (undated) DEVICE — SU WND CLOSURE V-LOC PBT SZ 0 18" GS-21 VLOCN0326

## (undated) DEVICE — GLOVE BIOGEL PI ORTHOPRO SZ 7.5 47675

## (undated) DEVICE — ANTIFOG SOLUTION SEE SHARP 150M TROCAR SWABS 30978 (COI)

## (undated) DEVICE — PREP CHLORAPREP 26ML TINTED HI-LITE ORANGE 930815

## (undated) DEVICE — DAVINCI XI OBTURATOR 8MM BLADELESS LONG 470360

## (undated) DEVICE — DRAPE U SPLIT 74X120" 29440

## (undated) DEVICE — SOL WATER IRRIG 1000ML BOTTLE 2F7114

## (undated) RX ORDER — ONDANSETRON 2 MG/ML
INJECTION INTRAMUSCULAR; INTRAVENOUS
Status: DISPENSED
Start: 2024-12-12

## (undated) RX ORDER — LIDOCAINE HYDROCHLORIDE 10 MG/ML
INJECTION, SOLUTION EPIDURAL; INFILTRATION; INTRACAUDAL; PERINEURAL
Status: DISPENSED
Start: 2024-12-12

## (undated) RX ORDER — PROPOFOL 10 MG/ML
INJECTION, EMULSION INTRAVENOUS
Status: DISPENSED
Start: 2024-12-12

## (undated) RX ORDER — KETOROLAC TROMETHAMINE 30 MG/ML
INJECTION, SOLUTION INTRAMUSCULAR; INTRAVENOUS
Status: DISPENSED
Start: 2024-12-12

## (undated) RX ORDER — BUPIVACAINE HYDROCHLORIDE 2.5 MG/ML
INJECTION, SOLUTION EPIDURAL; INFILTRATION; INTRACAUDAL
Status: DISPENSED
Start: 2024-12-12

## (undated) RX ORDER — FENTANYL CITRATE 50 UG/ML
INJECTION, SOLUTION INTRAMUSCULAR; INTRAVENOUS
Status: DISPENSED
Start: 2024-12-12

## (undated) RX ORDER — DEXAMETHASONE SODIUM PHOSPHATE 10 MG/ML
INJECTION, SOLUTION INTRAMUSCULAR; INTRAVENOUS
Status: DISPENSED
Start: 2024-12-12